# Patient Record
Sex: FEMALE | Race: WHITE | NOT HISPANIC OR LATINO
[De-identification: names, ages, dates, MRNs, and addresses within clinical notes are randomized per-mention and may not be internally consistent; named-entity substitution may affect disease eponyms.]

---

## 2017-01-05 ENCOUNTER — FORM ENCOUNTER (OUTPATIENT)
Age: 57
End: 2017-01-05

## 2017-01-06 ENCOUNTER — APPOINTMENT (OUTPATIENT)
Dept: RADIOLOGY | Facility: CLINIC | Age: 57
End: 2017-01-06

## 2017-01-06 ENCOUNTER — APPOINTMENT (OUTPATIENT)
Dept: MAMMOGRAPHY | Facility: CLINIC | Age: 57
End: 2017-01-06

## 2017-01-06 ENCOUNTER — APPOINTMENT (OUTPATIENT)
Dept: ULTRASOUND IMAGING | Facility: CLINIC | Age: 57
End: 2017-01-06

## 2017-01-06 ENCOUNTER — OUTPATIENT (OUTPATIENT)
Dept: OUTPATIENT SERVICES | Facility: HOSPITAL | Age: 57
LOS: 1 days | End: 2017-01-06
Payer: COMMERCIAL

## 2017-01-06 DIAGNOSIS — Z00.8 ENCOUNTER FOR OTHER GENERAL EXAMINATION: ICD-10-CM

## 2017-01-06 PROCEDURE — 76641 ULTRASOUND BREAST COMPLETE: CPT

## 2017-01-06 PROCEDURE — 77063 BREAST TOMOSYNTHESIS BI: CPT

## 2017-01-06 PROCEDURE — 77067 SCR MAMMO BI INCL CAD: CPT

## 2017-01-06 PROCEDURE — 77080 DXA BONE DENSITY AXIAL: CPT

## 2017-01-18 ENCOUNTER — FORM ENCOUNTER (OUTPATIENT)
Age: 57
End: 2017-01-18

## 2017-01-19 ENCOUNTER — OUTPATIENT (OUTPATIENT)
Dept: OUTPATIENT SERVICES | Facility: HOSPITAL | Age: 57
LOS: 1 days | End: 2017-01-19
Payer: COMMERCIAL

## 2017-01-19 ENCOUNTER — APPOINTMENT (OUTPATIENT)
Dept: ULTRASOUND IMAGING | Facility: CLINIC | Age: 57
End: 2017-01-19

## 2017-01-19 DIAGNOSIS — Z00.8 ENCOUNTER FOR OTHER GENERAL EXAMINATION: ICD-10-CM

## 2017-01-19 PROCEDURE — 76642 ULTRASOUND BREAST LIMITED: CPT

## 2017-01-24 DIAGNOSIS — N63 UNSPECIFIED LUMP IN BREAST: ICD-10-CM

## 2017-01-31 ENCOUNTER — RESULT REVIEW (OUTPATIENT)
Age: 57
End: 2017-01-31

## 2017-04-09 ENCOUNTER — EMERGENCY (EMERGENCY)
Facility: HOSPITAL | Age: 57
LOS: 0 days | Discharge: HOME | End: 2017-04-10
Attending: EMERGENCY MEDICINE | Admitting: EMERGENCY MEDICINE
Payer: COMMERCIAL

## 2017-04-09 VITALS
HEIGHT: 66 IN | TEMPERATURE: 98 F | RESPIRATION RATE: 17 BRPM | DIASTOLIC BLOOD PRESSURE: 70 MMHG | SYSTOLIC BLOOD PRESSURE: 135 MMHG | HEART RATE: 81 BPM | OXYGEN SATURATION: 99 % | WEIGHT: 229.94 LBS

## 2017-04-09 DIAGNOSIS — R07.9 CHEST PAIN, UNSPECIFIED: ICD-10-CM

## 2017-04-09 LAB
ALBUMIN SERPL ELPH-MCNC: 3.6 G/DL — SIGNIFICANT CHANGE UP (ref 3.3–5)
ALP SERPL-CCNC: 82 U/L — SIGNIFICANT CHANGE UP (ref 40–120)
ALT FLD-CCNC: 25 U/L — SIGNIFICANT CHANGE UP (ref 12–78)
ANION GAP SERPL CALC-SCNC: 11 MMOL/L — SIGNIFICANT CHANGE UP (ref 5–17)
AST SERPL-CCNC: 33 U/L — SIGNIFICANT CHANGE UP (ref 15–37)
BASOPHILS # BLD AUTO: 0.2 K/UL — SIGNIFICANT CHANGE UP (ref 0–0.2)
BASOPHILS NFR BLD AUTO: 1.3 % — SIGNIFICANT CHANGE UP (ref 0–2)
BILIRUB SERPL-MCNC: 0.4 MG/DL — SIGNIFICANT CHANGE UP (ref 0.2–1.2)
BUN SERPL-MCNC: 16 MG/DL — SIGNIFICANT CHANGE UP (ref 7–23)
CALCIUM SERPL-MCNC: 9.8 MG/DL — SIGNIFICANT CHANGE UP (ref 8.5–10.1)
CHLORIDE SERPL-SCNC: 104 MMOL/L — SIGNIFICANT CHANGE UP (ref 96–108)
CO2 SERPL-SCNC: 25 MMOL/L — SIGNIFICANT CHANGE UP (ref 22–31)
CREAT SERPL-MCNC: 0.71 MG/DL — SIGNIFICANT CHANGE UP (ref 0.5–1.3)
D DIMER BLD IA.RAPID-MCNC: <150 NG/ML DDU — SIGNIFICANT CHANGE UP
EOSINOPHIL # BLD AUTO: 0.3 K/UL — SIGNIFICANT CHANGE UP (ref 0–0.5)
EOSINOPHIL NFR BLD AUTO: 2.6 % — SIGNIFICANT CHANGE UP (ref 0–6)
GLUCOSE SERPL-MCNC: 154 MG/DL — HIGH (ref 70–99)
HCT VFR BLD CALC: 42.2 % — SIGNIFICANT CHANGE UP (ref 34.5–45)
HGB BLD-MCNC: 14 G/DL — SIGNIFICANT CHANGE UP (ref 11.5–15.5)
INR BLD: 1.09 RATIO — SIGNIFICANT CHANGE UP (ref 0.88–1.16)
LYMPHOCYTES # BLD AUTO: 36.8 % — SIGNIFICANT CHANGE UP (ref 13–44)
LYMPHOCYTES # BLD AUTO: 4.7 K/UL — HIGH (ref 1–3.3)
MAGNESIUM SERPL-MCNC: 2 MG/DL — SIGNIFICANT CHANGE UP (ref 1.8–2.4)
MCHC RBC-ENTMCNC: 28.4 PG — SIGNIFICANT CHANGE UP (ref 27–34)
MCHC RBC-ENTMCNC: 33.2 GM/DL — SIGNIFICANT CHANGE UP (ref 32–36)
MCV RBC AUTO: 85.7 FL — SIGNIFICANT CHANGE UP (ref 80–100)
MONOCYTES # BLD AUTO: 0.7 K/UL — SIGNIFICANT CHANGE UP (ref 0–0.9)
MONOCYTES NFR BLD AUTO: 5.3 % — SIGNIFICANT CHANGE UP (ref 2–14)
NEUTROPHILS # BLD AUTO: 6.9 K/UL — SIGNIFICANT CHANGE UP (ref 1.8–7.4)
NEUTROPHILS NFR BLD AUTO: 54 % — SIGNIFICANT CHANGE UP (ref 43–77)
PLATELET # BLD AUTO: 328 K/UL — SIGNIFICANT CHANGE UP (ref 150–400)
POTASSIUM SERPL-MCNC: 4.5 MMOL/L — SIGNIFICANT CHANGE UP (ref 3.5–5.3)
POTASSIUM SERPL-SCNC: 4.5 MMOL/L — SIGNIFICANT CHANGE UP (ref 3.5–5.3)
PROT SERPL-MCNC: 8.1 GM/DL — SIGNIFICANT CHANGE UP (ref 6–8.3)
PROTHROM AB SERPL-ACNC: 11.8 SEC — SIGNIFICANT CHANGE UP (ref 9.8–12.7)
RBC # BLD: 4.93 M/UL — SIGNIFICANT CHANGE UP (ref 3.8–5.2)
RBC # FLD: 12.6 % — SIGNIFICANT CHANGE UP (ref 10.3–14.5)
SODIUM SERPL-SCNC: 140 MMOL/L — SIGNIFICANT CHANGE UP (ref 135–145)
TROPONIN I SERPL-MCNC: <0.015 NG/ML — SIGNIFICANT CHANGE UP (ref 0.01–0.04)
WBC # BLD: 12.8 K/UL — HIGH (ref 3.8–10.5)
WBC # FLD AUTO: 12.8 K/UL — HIGH (ref 3.8–10.5)

## 2017-04-09 PROCEDURE — 99285 EMERGENCY DEPT VISIT HI MDM: CPT | Mod: 25

## 2017-04-09 PROCEDURE — 71010: CPT | Mod: 26

## 2017-04-09 PROCEDURE — 93010 ELECTROCARDIOGRAM REPORT: CPT

## 2017-04-09 RX ORDER — ASPIRIN/CALCIUM CARB/MAGNESIUM 324 MG
243 TABLET ORAL DAILY
Qty: 0 | Refills: 0 | Status: DISCONTINUED | OUTPATIENT
Start: 2017-04-09 | End: 2017-04-10

## 2017-04-09 RX ADMIN — Medication 243 MILLIGRAM(S): at 22:23

## 2017-04-09 NOTE — ED PROVIDER NOTE - MEDICAL DECISION MAKING DETAILS
Pt wo any cp.  All labs reviewed and pt is stable for DC to follow up with PMD this week.  Pt and  agree with plan

## 2017-04-09 NOTE — ED ADULT NURSE NOTE - CHPI ED SYMPTOMS NEG
no diaphoresis/no shortness of breath/no cough/no syncope/no dizziness/no nausea/no fever/no back pain/no vomiting/no chills

## 2017-04-09 NOTE — ED ADULT TRIAGE NOTE - CHIEF COMPLAINT QUOTE
Chest pain radiating into jaw and neck starting at 18:00. Reports taking aspirin at 18:00. Reports palpitations earlier today.

## 2017-04-09 NOTE — ED ADULT NURSE NOTE - OBJECTIVE STATEMENT
Pt is a 57y male, A & O x 4, presents to ED w/  chest pain left sided, radiates to throat neck and left arm, pt also c/o palpatations that began this morning but since resolved, neuro's intact, pt denies dizziness, nausea, vomiting or diarrhea. pt denies SOB, chills or fever, pt in no apparent distress, VSS, bed rails up, will continue to monitor.

## 2017-04-10 VITALS
RESPIRATION RATE: 17 BRPM | DIASTOLIC BLOOD PRESSURE: 69 MMHG | OXYGEN SATURATION: 99 % | SYSTOLIC BLOOD PRESSURE: 128 MMHG | TEMPERATURE: 98 F

## 2017-04-10 LAB — TROPONIN I SERPL-MCNC: <0.015 NG/ML — SIGNIFICANT CHANGE UP (ref 0.01–0.04)

## 2017-07-25 ENCOUNTER — MESSAGE (OUTPATIENT)
Age: 57
End: 2017-07-25

## 2017-08-28 ENCOUNTER — FORM ENCOUNTER (OUTPATIENT)
Age: 57
End: 2017-08-28

## 2017-08-29 ENCOUNTER — APPOINTMENT (OUTPATIENT)
Dept: MAMMOGRAPHY | Facility: CLINIC | Age: 57
End: 2017-08-29
Payer: COMMERCIAL

## 2017-08-29 ENCOUNTER — OUTPATIENT (OUTPATIENT)
Dept: OUTPATIENT SERVICES | Facility: HOSPITAL | Age: 57
LOS: 1 days | End: 2017-08-29
Payer: COMMERCIAL

## 2017-08-29 ENCOUNTER — APPOINTMENT (OUTPATIENT)
Dept: ULTRASOUND IMAGING | Facility: CLINIC | Age: 57
End: 2017-08-29
Payer: COMMERCIAL

## 2017-08-29 DIAGNOSIS — Z00.8 ENCOUNTER FOR OTHER GENERAL EXAMINATION: ICD-10-CM

## 2017-08-29 PROCEDURE — 76642 ULTRASOUND BREAST LIMITED: CPT

## 2017-08-29 PROCEDURE — 77065 DX MAMMO INCL CAD UNI: CPT

## 2017-08-29 PROCEDURE — G0279: CPT | Mod: 26

## 2017-08-29 PROCEDURE — G0206: CPT | Mod: 26,LT

## 2017-08-29 PROCEDURE — 76642 ULTRASOUND BREAST LIMITED: CPT | Mod: 26,LT

## 2017-08-29 PROCEDURE — G0279: CPT

## 2017-09-08 ENCOUNTER — INPATIENT (INPATIENT)
Facility: HOSPITAL | Age: 57
LOS: 1 days | Discharge: HOME | End: 2017-09-10
Attending: INTERNAL MEDICINE | Admitting: EMERGENCY MEDICINE
Payer: COMMERCIAL

## 2017-09-08 VITALS
WEIGHT: 229.94 LBS | TEMPERATURE: 98 F | HEIGHT: 66 IN | HEART RATE: 83 BPM | OXYGEN SATURATION: 100 % | RESPIRATION RATE: 18 BRPM

## 2017-09-08 DIAGNOSIS — Z90.710 ACQUIRED ABSENCE OF BOTH CERVIX AND UTERUS: Chronic | ICD-10-CM

## 2017-09-08 DIAGNOSIS — N30.00 ACUTE CYSTITIS WITHOUT HEMATURIA: ICD-10-CM

## 2017-09-08 DIAGNOSIS — K40.90 UNILATERAL INGUINAL HERNIA, WITHOUT OBSTRUCTION OR GANGRENE, NOT SPECIFIED AS RECURRENT: Chronic | ICD-10-CM

## 2017-09-08 DIAGNOSIS — R94.31 ABNORMAL ELECTROCARDIOGRAM [ECG] [EKG]: ICD-10-CM

## 2017-09-08 DIAGNOSIS — R55 SYNCOPE AND COLLAPSE: ICD-10-CM

## 2017-09-08 DIAGNOSIS — Z90.49 ACQUIRED ABSENCE OF OTHER SPECIFIED PARTS OF DIGESTIVE TRACT: Chronic | ICD-10-CM

## 2017-09-08 LAB
ADD ON TEST-SPECIMEN IN LAB: SIGNIFICANT CHANGE UP
ALBUMIN SERPL ELPH-MCNC: 3.5 G/DL — SIGNIFICANT CHANGE UP (ref 3.3–5)
ALP SERPL-CCNC: 72 U/L — SIGNIFICANT CHANGE UP (ref 40–120)
ALT FLD-CCNC: 21 U/L — SIGNIFICANT CHANGE UP (ref 12–78)
ANION GAP SERPL CALC-SCNC: 7 MMOL/L — SIGNIFICANT CHANGE UP (ref 5–17)
APPEARANCE UR: (no result)
APTT BLD: 27.8 SEC — SIGNIFICANT CHANGE UP (ref 27.5–37.4)
AST SERPL-CCNC: 22 U/L — SIGNIFICANT CHANGE UP (ref 15–37)
BACTERIA # UR AUTO: (no result)
BASOPHILS # BLD AUTO: 0.1 K/UL — SIGNIFICANT CHANGE UP (ref 0–0.2)
BASOPHILS NFR BLD AUTO: 1.5 % — SIGNIFICANT CHANGE UP (ref 0–2)
BILIRUB SERPL-MCNC: 0.3 MG/DL — SIGNIFICANT CHANGE UP (ref 0.2–1.2)
BILIRUB UR-MCNC: NEGATIVE — SIGNIFICANT CHANGE UP
BUN SERPL-MCNC: 11 MG/DL — SIGNIFICANT CHANGE UP (ref 7–23)
CALCIUM SERPL-MCNC: 9.3 MG/DL — SIGNIFICANT CHANGE UP (ref 8.5–10.1)
CHLORIDE SERPL-SCNC: 106 MMOL/L — SIGNIFICANT CHANGE UP (ref 96–108)
CO2 SERPL-SCNC: 25 MMOL/L — SIGNIFICANT CHANGE UP (ref 22–31)
COLOR SPEC: YELLOW — SIGNIFICANT CHANGE UP
CREAT SERPL-MCNC: 0.54 MG/DL — SIGNIFICANT CHANGE UP (ref 0.5–1.3)
D DIMER BLD IA.RAPID-MCNC: <150 NG/ML DDU — SIGNIFICANT CHANGE UP
DIFF PNL FLD: (no result)
EOSINOPHIL # BLD AUTO: 0.2 K/UL — SIGNIFICANT CHANGE UP (ref 0–0.5)
EOSINOPHIL NFR BLD AUTO: 2 % — SIGNIFICANT CHANGE UP (ref 0–6)
EPI CELLS # UR: SIGNIFICANT CHANGE UP
GLUCOSE SERPL-MCNC: 106 MG/DL — HIGH (ref 70–99)
GLUCOSE UR QL: NEGATIVE MG/DL — SIGNIFICANT CHANGE UP
HCT VFR BLD CALC: 38 % — SIGNIFICANT CHANGE UP (ref 34.5–45)
HGB BLD-MCNC: 12.8 G/DL — SIGNIFICANT CHANGE UP (ref 11.5–15.5)
INR BLD: 1.17 RATIO — HIGH (ref 0.88–1.16)
KETONES UR-MCNC: NEGATIVE — SIGNIFICANT CHANGE UP
LEUKOCYTE ESTERASE UR-ACNC: NEGATIVE — SIGNIFICANT CHANGE UP
LYMPHOCYTES # BLD AUTO: 2.9 K/UL — SIGNIFICANT CHANGE UP (ref 1–3.3)
LYMPHOCYTES # BLD AUTO: 31.7 % — SIGNIFICANT CHANGE UP (ref 13–44)
MAGNESIUM SERPL-MCNC: 2 MG/DL — SIGNIFICANT CHANGE UP (ref 1.6–2.6)
MCHC RBC-ENTMCNC: 28.4 PG — SIGNIFICANT CHANGE UP (ref 27–34)
MCHC RBC-ENTMCNC: 33.7 GM/DL — SIGNIFICANT CHANGE UP (ref 32–36)
MCV RBC AUTO: 84.4 FL — SIGNIFICANT CHANGE UP (ref 80–100)
MONOCYTES # BLD AUTO: 0.7 K/UL — SIGNIFICANT CHANGE UP (ref 0–0.9)
MONOCYTES NFR BLD AUTO: 7.3 % — SIGNIFICANT CHANGE UP (ref 2–14)
NEUTROPHILS # BLD AUTO: 5.3 K/UL — SIGNIFICANT CHANGE UP (ref 1.8–7.4)
NEUTROPHILS NFR BLD AUTO: 57.5 % — SIGNIFICANT CHANGE UP (ref 43–77)
NITRITE UR-MCNC: POSITIVE
NT-PROBNP SERPL-SCNC: 59 PG/ML — SIGNIFICANT CHANGE UP (ref 0–125)
PH UR: 5 — SIGNIFICANT CHANGE UP (ref 5–8)
PLATELET # BLD AUTO: 248 K/UL — SIGNIFICANT CHANGE UP (ref 150–400)
POTASSIUM SERPL-MCNC: 3.7 MMOL/L — SIGNIFICANT CHANGE UP (ref 3.5–5.3)
POTASSIUM SERPL-SCNC: 3.7 MMOL/L — SIGNIFICANT CHANGE UP (ref 3.5–5.3)
PROT SERPL-MCNC: 7.2 GM/DL — SIGNIFICANT CHANGE UP (ref 6–8.3)
PROT UR-MCNC: NEGATIVE MG/DL — SIGNIFICANT CHANGE UP
PROTHROM AB SERPL-ACNC: 12.7 SEC — SIGNIFICANT CHANGE UP (ref 9.8–12.7)
RBC # BLD: 4.51 M/UL — SIGNIFICANT CHANGE UP (ref 3.8–5.2)
RBC # FLD: 12.9 % — SIGNIFICANT CHANGE UP (ref 10.3–14.5)
RBC CASTS # UR COMP ASSIST: SIGNIFICANT CHANGE UP /HPF (ref 0–4)
SODIUM SERPL-SCNC: 138 MMOL/L — SIGNIFICANT CHANGE UP (ref 135–145)
SP GR SPEC: 1.02 — SIGNIFICANT CHANGE UP (ref 1.01–1.02)
TROPONIN I SERPL-MCNC: <0.015 NG/ML — SIGNIFICANT CHANGE UP (ref 0.01–0.04)
UROBILINOGEN FLD QL: NEGATIVE MG/DL — SIGNIFICANT CHANGE UP
WBC # BLD: 9.2 K/UL — SIGNIFICANT CHANGE UP (ref 3.8–10.5)
WBC # FLD AUTO: 9.2 K/UL — SIGNIFICANT CHANGE UP (ref 3.8–10.5)
WBC UR QL: SIGNIFICANT CHANGE UP

## 2017-09-08 PROCEDURE — 99285 EMERGENCY DEPT VISIT HI MDM: CPT

## 2017-09-08 PROCEDURE — 72125 CT NECK SPINE W/O DYE: CPT | Mod: 26

## 2017-09-08 PROCEDURE — 71010: CPT | Mod: 26

## 2017-09-08 PROCEDURE — 70450 CT HEAD/BRAIN W/O DYE: CPT | Mod: 26

## 2017-09-08 PROCEDURE — 93010 ELECTROCARDIOGRAM REPORT: CPT

## 2017-09-08 RX ORDER — SODIUM CHLORIDE 9 MG/ML
1000 INJECTION INTRAMUSCULAR; INTRAVENOUS; SUBCUTANEOUS ONCE
Qty: 0 | Refills: 0 | Status: COMPLETED | OUTPATIENT
Start: 2017-09-08 | End: 2017-09-08

## 2017-09-08 RX ORDER — ACETAMINOPHEN 500 MG
650 TABLET ORAL EVERY 6 HOURS
Qty: 0 | Refills: 0 | Status: DISCONTINUED | OUTPATIENT
Start: 2017-09-08 | End: 2017-09-10

## 2017-09-08 RX ORDER — SODIUM CHLORIDE 9 MG/ML
500 INJECTION INTRAMUSCULAR; INTRAVENOUS; SUBCUTANEOUS ONCE
Qty: 0 | Refills: 0 | Status: COMPLETED | OUTPATIENT
Start: 2017-09-08 | End: 2017-09-08

## 2017-09-08 RX ORDER — HEPARIN SODIUM 5000 [USP'U]/ML
5000 INJECTION INTRAVENOUS; SUBCUTANEOUS EVERY 8 HOURS
Qty: 0 | Refills: 0 | Status: DISCONTINUED | OUTPATIENT
Start: 2017-09-08 | End: 2017-09-10

## 2017-09-08 RX ORDER — NITROFURANTOIN MACROCRYSTAL 50 MG
100 CAPSULE ORAL
Qty: 0 | Refills: 0 | Status: DISCONTINUED | OUTPATIENT
Start: 2017-09-08 | End: 2017-09-10

## 2017-09-08 RX ORDER — SODIUM CHLORIDE 9 MG/ML
1000 INJECTION INTRAMUSCULAR; INTRAVENOUS; SUBCUTANEOUS
Qty: 0 | Refills: 0 | Status: DISCONTINUED | OUTPATIENT
Start: 2017-09-08 | End: 2017-09-09

## 2017-09-08 RX ORDER — ACETAMINOPHEN 500 MG
650 TABLET ORAL ONCE
Qty: 0 | Refills: 0 | Status: COMPLETED | OUTPATIENT
Start: 2017-09-08 | End: 2017-09-08

## 2017-09-08 RX ADMIN — Medication 650 MILLIGRAM(S): at 16:20

## 2017-09-08 RX ADMIN — SODIUM CHLORIDE 1000 MILLILITER(S): 9 INJECTION INTRAMUSCULAR; INTRAVENOUS; SUBCUTANEOUS at 13:53

## 2017-09-08 RX ADMIN — SODIUM CHLORIDE 2000 MILLILITER(S): 9 INJECTION INTRAMUSCULAR; INTRAVENOUS; SUBCUTANEOUS at 15:35

## 2017-09-08 RX ADMIN — HEPARIN SODIUM 5000 UNIT(S): 5000 INJECTION INTRAVENOUS; SUBCUTANEOUS at 22:00

## 2017-09-08 RX ADMIN — Medication 650 MILLIGRAM(S): at 16:50

## 2017-09-08 RX ADMIN — Medication 100 MILLIGRAM(S): at 21:39

## 2017-09-08 NOTE — H&P ADULT - PSH
H/O abdominal hysterectomy    History of cholecystectomy    Inguinal hernia without obstruction or gangrene, recurrence not specified, unspecified laterality

## 2017-09-08 NOTE — H&P ADULT - PROBLEM SELECTOR PLAN 1
Admit to telemetry, follow last Tpn (1st 2 normal)  Avoid drugs that could potentially prolong QT interval  add Mg stat, rest of electrolytes normal (K+ Ca+)  Cardiology consult  Echo in AM  Inpatient vs Outpatient Cardiac work up per Cardio

## 2017-09-08 NOTE — H&P ADULT - HISTORY OF PRESENT ILLNESS
56 y/o F with a PMHx of presents to the ED c/o syncopal episode that occurred earlier today while at work today. Pt states that she was standing and then remembers waking up on the floor with co workers around her. Pt friend states that she struck her head on the ground and did have LOC approximately 30 seconds reported. Pt currently calm, awake, alert and denies any other acute c/o at this time. Denies preceding CP/SOB, HA, weakness/numbness.  Patient was seen in the ED here 4/2017 with CP, d/c from ED never saw PMD/Cardio. EKG at that time same as today, increased , inverted T waves anterior leads)

## 2017-09-08 NOTE — ED PROVIDER NOTE - OBJECTIVE STATEMENT
56 y/o F with a PMHx of presents to the ED c/o syncopal episode that occurred earlier today while at work today. Pt states that she was standing and then remembers waking up on the floor with co workers around her. Pt friend states that she struck her head on the ground and did have LOC. Pt currently calm, awake, alert and denies any other acute c/o at this time.

## 2017-09-08 NOTE — ED ADULT NURSE REASSESSMENT NOTE - NS ED NURSE REASSESS COMMENT FT1
Report given to [address]. Pt and family aware of transfer. Pt medicated for UTI. VSS. Will continue to monitor for safety and comfort.

## 2017-09-08 NOTE — H&P ADULT - NSHPLABSRESULTS_GEN_ALL_CORE
12.8   9.2   )-----------( 248      ( 08 Sep 2017 13:29 )             38.0         138  |  106  |  11  ----------------------------<  106<H>  3.7   |  25  |  0.54    Ca    9.3      08 Sep 2017 13:29    TPro  7.2  /  Alb  3.5  /  TBili  0.3  /  DBili  x   /  AST  22  /  ALT  21  /  AlkPhos  72      CARDIAC MARKERS ( 08 Sep 2017 16:50 )  <0.015 ng/mL / x     / x     / x     / x      CARDIAC MARKERS ( 08 Sep 2017 13:29 )  <0.015 ng/mL / x     / x     / x     / x          LIVER FUNCTIONS - ( 08 Sep 2017 13:29 )  Alb: 3.5 g/dL / Pro: 7.2 gm/dL / ALK PHOS: 72 U/L / ALT: 21 U/L / AST: 22 U/L / GGT: x           PT/INR - ( 08 Sep 2017 13:30 )   PT: 12.7 sec;   INR: 1.17 ratio         PTT - ( 08 Sep 2017 13:30 )  PTT:27.8 sec  Urinalysis Basic - ( 08 Sep 2017 14:45 )    Color: Yellow / Appearance: x / S.020 / pH: x  Gluc: x / Ketone: Negative  / Bili: Negative / Urobili: Negative mg/dL   Blood: x / Protein: Negative mg/dL / Nitrite: Positive   Leuk Esterase: Negative / RBC: 0-2 /HPF / WBC 0-2   Sq Epi: x / Non Sq Epi: x / Bacteria: Many          Blood, Urine: Trace ( @ 14:45)

## 2017-09-08 NOTE — ED ADULT NURSE REASSESSMENT NOTE - NS ED NURSE REASSESS COMMENT FT1
Pt medicated with tylenol for headache. VSS.  Second bolus of fluid infusing at this time. Will continue to monitor for safety and comfort.

## 2017-09-08 NOTE — ED ADULT NURSE NOTE - OBJECTIVE STATEMENT
56 y/o F BIBA s/p syncopal episode at work. Pt c/o head, neck and jaw pain at this time. Pt states prior to episode she felt slightly dizzy. Pt states she ate breakfast this morning. Denies syncopal hx or medical hx. No current meds.

## 2017-09-09 LAB
HBA1C BLD-MCNC: 6.6 % — HIGH (ref 4–5.6)
TSH SERPL-MCNC: 1.49 UIU/ML — SIGNIFICANT CHANGE UP (ref 0.36–3.74)

## 2017-09-09 PROCEDURE — 93010 ELECTROCARDIOGRAM REPORT: CPT

## 2017-09-09 PROCEDURE — 93306 TTE W/DOPPLER COMPLETE: CPT | Mod: 26

## 2017-09-09 RX ADMIN — HEPARIN SODIUM 5000 UNIT(S): 5000 INJECTION INTRAVENOUS; SUBCUTANEOUS at 22:02

## 2017-09-09 RX ADMIN — Medication 100 MILLIGRAM(S): at 18:21

## 2017-09-09 RX ADMIN — HEPARIN SODIUM 5000 UNIT(S): 5000 INJECTION INTRAVENOUS; SUBCUTANEOUS at 14:42

## 2017-09-09 RX ADMIN — Medication 100 MILLIGRAM(S): at 08:30

## 2017-09-09 RX ADMIN — HEPARIN SODIUM 5000 UNIT(S): 5000 INJECTION INTRAVENOUS; SUBCUTANEOUS at 06:00

## 2017-09-09 NOTE — PROGRESS NOTE ADULT - SUBJECTIVE AND OBJECTIVE BOX
History and Physical:   Dr Walton, PCP - notified      History of Present Illness:  Chief Complaint/Reason for Admission: Syncope    History of Present Illness:    56 y/o F with a PMHx of presents to the ED c/o syncopal episode that occurred earlier today while at work today. Pt states that she was standing and then remembers waking up on the floor with co workers around her. Pt friend states that she struck her head on the ground and did have LOC approximately 30 seconds reported. Pt currently calm, awake, alert and denies any other acute c/o at this time. Denies preceding CP/SOB, HA, weakness/numbness.  Patient was seen in the ED here 4/2017 with CP, d/c from ED never saw PMD/Cardio. EKG at that time same as today, increased , inverted T waves anterior leads)  9/9 - no events on tele.  c/o HA no cp, dizziness, sob.    ICU Vital Signs Last 24 Hrs  T(C): 36.8 (09 Sep 2017 11:34), Max: 36.8 (09 Sep 2017 11:34)  T(F): 98.3 (09 Sep 2017 11:34), Max: 98.3 (09 Sep 2017 11:34)  HR: 66 (09 Sep 2017 11:34) (66 - 76)  BP: 155/77 (09 Sep 2017 11:34) (139/80 - 155/77)  BP(mean): --  ABP: --  ABP(mean): --  RR: 18 (09 Sep 2017 11:34) (15 - 18)  SpO2: 95% (09 Sep 2017 11:34) (95% - 98%)    GEN: lying in bed, NAD  HEENT:   NC, pupils equal and reactive, EOMI  CV:  +S1, +S2, RRR  RESP:   lungs clear to auscultation bilaterally, no wheeze, rales, rhonchi   BREAST:  not examined  GI:  abdomen soft, non-tender, non-distended, normoactive BS  RECTAL:  not examined  :  not examined  MSK:   normal muscle tone  EXT:  no edema  NEURO:  AAOX3, no focal neurological deficits  SKIN:  no rashes      Assessment and Plan:     # Prolonged QT interval.  Plan: Admit to telemetry, follow last Tpn (1st 2 normal)  Avoid drugs that could potentially prolong QT interval  - f/u cardio consult  - monitor and replace lytes  - monitor on tele  - f/u ECHO results  - possible holter upon d/c     # Syncope, unspecified syncope type.  Plan: IVF  Echo, repeat labs in AM, Cardio as above.     #  Acute cystitis without hematuria.  Plan: UA positive in ED  Follow Urine Cx, likely on d/c  Macrobid 100mg BID  Encourage oral fluids.     dispo - likely d/c in AM if ok with cardio and no events on tele

## 2017-09-09 NOTE — DIETITIAN INITIAL EVALUATION ADULT. - OTHER INFO
Nutrition consult for BMI >40, however current BMI 37.7. Tolerating regular diet with ~100% intake noted. Denies any difficulty chewing or swallowing. Skin intact with no edema noted. General healthy Nutrition consult for BMI >40, however current BMI 37.7. Tolerating regular diet with ~100% intake noted. Denies any difficulty chewing or swallowing. Skin intact with no edema noted. General healthy diet instruction provided with RD contact information for any further questions or concerns.

## 2017-09-09 NOTE — CHART NOTE - NSCHARTNOTEFT_GEN_A_CORE
Call received from RN staff, increased BP    Pt. is 57y F presents to the ED after syncopal episode, admitted with Prolonged QT interval, Syncope, acute cystitis. Troponins negative, CT head negative for acute pathology, patient currently having elevated blood pressure, 180/74. No history of Hypertension, no hypertensive medications in outpatient medication review. Patient IVF running at 100cc/hr D/C'd. Patient on regular diet, may still take in fluids PO, will repeat blood pressure readings in one hour.    Vital Signs Last 24 Hrs  T(C): 36.4 (09 Sep 2017 20:28), Max: 36.9 (09 Sep 2017 16:56)  T(F): 97.6 (09 Sep 2017 20:28), Max: 98.4 (09 Sep 2017 16:56)  HR: 82 (09 Sep 2017 20:28) (66 - 82)  BP: 180/74 (09 Sep 2017 20:28) (155/77 - 180/74)  BP(mean): --  RR: 18 (09 Sep 2017 11:34) (18 - 18)  SpO2: 96% (09 Sep 2017 20:28) (95% - 96%)    A/P  57y F with Prolonged QT, Syncope, Cystitis, currently showing Hypertension.  -D/C fluids  -Recheck BP one hour

## 2017-09-10 ENCOUNTER — TRANSCRIPTION ENCOUNTER (OUTPATIENT)
Age: 57
End: 2017-09-10

## 2017-09-10 VITALS
DIASTOLIC BLOOD PRESSURE: 78 MMHG | OXYGEN SATURATION: 94 % | RESPIRATION RATE: 18 BRPM | HEART RATE: 78 BPM | SYSTOLIC BLOOD PRESSURE: 135 MMHG | TEMPERATURE: 99 F

## 2017-09-10 LAB
-  AMIKACIN: SIGNIFICANT CHANGE UP
-  AMPICILLIN/SULBACTAM: SIGNIFICANT CHANGE UP
-  AMPICILLIN: SIGNIFICANT CHANGE UP
-  AZTREONAM: SIGNIFICANT CHANGE UP
-  CEFAZOLIN: SIGNIFICANT CHANGE UP
-  CEFEPIME: SIGNIFICANT CHANGE UP
-  CEFOXITIN: SIGNIFICANT CHANGE UP
-  CEFTAZIDIME: SIGNIFICANT CHANGE UP
-  CEFTRIAXONE: SIGNIFICANT CHANGE UP
-  CIPROFLOXACIN: SIGNIFICANT CHANGE UP
-  ERTAPENEM: SIGNIFICANT CHANGE UP
-  GENTAMICIN: SIGNIFICANT CHANGE UP
-  IMIPENEM: SIGNIFICANT CHANGE UP
-  LEVOFLOXACIN: SIGNIFICANT CHANGE UP
-  MEROPENEM: SIGNIFICANT CHANGE UP
-  NITROFURANTOIN: SIGNIFICANT CHANGE UP
-  PIPERACILLIN/TAZOBACTAM: SIGNIFICANT CHANGE UP
-  TOBRAMYCIN: SIGNIFICANT CHANGE UP
-  TRIMETHOPRIM/SULFAMETHOXAZOLE: SIGNIFICANT CHANGE UP
ALBUMIN SERPL ELPH-MCNC: 3.3 G/DL — SIGNIFICANT CHANGE UP (ref 3.3–5)
ALP SERPL-CCNC: 69 U/L — SIGNIFICANT CHANGE UP (ref 40–120)
ALT FLD-CCNC: 21 U/L — SIGNIFICANT CHANGE UP (ref 12–78)
ANION GAP SERPL CALC-SCNC: 8 MMOL/L — SIGNIFICANT CHANGE UP (ref 5–17)
AST SERPL-CCNC: 13 U/L — LOW (ref 15–37)
BILIRUB SERPL-MCNC: 0.3 MG/DL — SIGNIFICANT CHANGE UP (ref 0.2–1.2)
BUN SERPL-MCNC: 10 MG/DL — SIGNIFICANT CHANGE UP (ref 7–23)
CALCIUM SERPL-MCNC: 9.1 MG/DL — SIGNIFICANT CHANGE UP (ref 8.5–10.1)
CHLORIDE SERPL-SCNC: 106 MMOL/L — SIGNIFICANT CHANGE UP (ref 96–108)
CO2 SERPL-SCNC: 26 MMOL/L — SIGNIFICANT CHANGE UP (ref 22–31)
CREAT SERPL-MCNC: 0.55 MG/DL — SIGNIFICANT CHANGE UP (ref 0.5–1.3)
CULTURE RESULTS: SIGNIFICANT CHANGE UP
GLUCOSE SERPL-MCNC: 130 MG/DL — HIGH (ref 70–99)
METHOD TYPE: SIGNIFICANT CHANGE UP
ORGANISM # SPEC MICROSCOPIC CNT: SIGNIFICANT CHANGE UP
ORGANISM # SPEC MICROSCOPIC CNT: SIGNIFICANT CHANGE UP
POTASSIUM SERPL-MCNC: 3.6 MMOL/L — SIGNIFICANT CHANGE UP (ref 3.5–5.3)
POTASSIUM SERPL-SCNC: 3.6 MMOL/L — SIGNIFICANT CHANGE UP (ref 3.5–5.3)
PROT SERPL-MCNC: 6.8 GM/DL — SIGNIFICANT CHANGE UP (ref 6–8.3)
SODIUM SERPL-SCNC: 140 MMOL/L — SIGNIFICANT CHANGE UP (ref 135–145)
SPECIMEN SOURCE: SIGNIFICANT CHANGE UP

## 2017-09-10 RX ORDER — ACETAMINOPHEN 500 MG
2 TABLET ORAL
Qty: 0 | Refills: 0 | DISCHARGE
Start: 2017-09-10

## 2017-09-10 RX ORDER — CEFUROXIME AXETIL 250 MG
1 TABLET ORAL
Qty: 6 | Refills: 0
Start: 2017-09-10 | End: 2017-09-13

## 2017-09-10 RX ADMIN — HEPARIN SODIUM 5000 UNIT(S): 5000 INJECTION INTRAVENOUS; SUBCUTANEOUS at 05:24

## 2017-09-10 RX ADMIN — Medication 100 MILLIGRAM(S): at 10:49

## 2017-09-10 NOTE — DISCHARGE NOTE ADULT - CARE PLAN
Principal Discharge DX:	Syncope, unspecified syncope type  Goal:	complete workup  Instructions for follow-up, activity and diet:	follow up with cardiology and primary doctor as outpatient

## 2017-09-10 NOTE — PROGRESS NOTE ADULT - SUBJECTIVE AND OBJECTIVE BOX
Patient is a 57y old  Female who presents with a chief complaint of Syncope (08 Sep 2017 20:52)      HPI:  56 y/o F with a PMHx of presents to the ED c/o syncopal episode that occurred earlier today while at work today. Pt states that she was standing and then remembers waking up on the floor with co workers around her. Pt friend states that she struck her head on the ground and did have LOC approximately 30 seconds reported. Pt currently calm, awake, alert and denies any other acute c/o at this time. Denies preceding CP/SOB, HA, weakness/numbness.  Patient was seen in the ED here 2017 with CP, d/c from ED never saw PMD/Cardio. EKG at that time same as today, increased , inverted T waves anterior leads) (08 Sep 2017 20:52)  9/10 no new complaints.     PAST MEDICAL & SURGICAL HISTORY:  No pertinent past medical history  History of cholecystectomy  Inguinal hernia without obstruction or gangrene, recurrence not specified, unspecified laterality  H/O abdominal hysterectomy        MEDICATIONS  (STANDING):  heparin  Injectable 5000 Unit(s) SubCutaneous every 8 hours  nitrofurantoin (MACROBID) 100 milliGRAM(s) Oral two times a day with meals    MEDICATIONS  (PRN):  acetaminophen   Tablet. 650 milliGRAM(s) Oral every 6 hours PRN Moderate Pain (4 - 6)          Vital Signs Last 24 Hrs  T(C): 36.9 (10 Sep 2017 04:25), Max: 36.9 (09 Sep 2017 16:56)  T(F): 98.4 (10 Sep 2017 04:25), Max: 98.5 (09 Sep 2017 21:59)  HR: 69 (10 Sep 2017 04:25) (66 - 82)  BP: 151/70 (10 Sep 2017 04:25) (128/67 - 180/74)  BP(mean): --  RR: 18 (09 Sep 2017 11:34) (18 - 18)  SpO2: 94% (10 Sep 2017 04:25) (94% - 96%)    I&O's Summary    09 Sep 2017 07:01  -  10 Sep 2017 07:00  --------------------------------------------------------  IN: 1740 mL / OUT: 0 mL / NET: 1740 mL        PHYSICAL EXAM  General Appearance:NAD  HEENT:   Neck:   Back:   Lungs: clear  Heart: RR S1S2 syst m  Abdomen: soft nt  Extremities: no edema  Skin:   Neurologic: alert and oriented      INTERPRETATION OF TELEMETRY: NSR sinus adeline and occ juntional bradycardia when sleeping    ECG: NSR ant t wave inv        LABS:                          12.8   9.2   )-----------( 248      ( 08 Sep 2017 13:29 )             38.0     -10    140  |  106  |  10  ----------------------------<  130<H>  3.6   |  26  |  0.55    Ca    9.1      10 Sep 2017 06:12  Mg     2.0         TPro  6.8  /  Alb  3.3  /  TBili  0.3  /  DBili  x   /  AST  13<L>  /  ALT  21  /  AlkPhos  69  09-10    CARDIAC MARKERS ( 08 Sep 2017 21:42 )  <0.015 ng/mL / x     / x     / x     / x      CARDIAC MARKERS ( 08 Sep 2017 16:50 )  <0.015 ng/mL / x     / x     / x     / x      CARDIAC MARKERS ( 08 Sep 2017 13:29 )  <0.015 ng/mL / x     / x     / x     / x            Pro BNP  --  @ 13:30  D Dimer  <150  @ 13:30  Pro BNP  59  @ 13:29  D Dimer  --  @ 13:29    PT/INR - ( 08 Sep 2017 13:30 )   PT: 12.7 sec;   INR: 1.17 ratio         PTT - ( 08 Sep 2017 13:30 )  PTT:27.8 sec  Urinalysis Basic - ( 08 Sep 2017 14:45 )    Color: Yellow / Appearance: x / S.020 / pH: x  Gluc: x / Ketone: Negative  / Bili: Negative / Urobili: Negative mg/dL   Blood: x / Protein: Negative mg/dL / Nitrite: Positive   Leuk Esterase: Negative / RBC: 0-2 /HPF / WBC 0-2   Sq Epi: x / Non Sq Epi: x / Bacteria: Many            RADIOLOGY & ADDITIONAL STUDIES:

## 2017-09-10 NOTE — DISCHARGE NOTE ADULT - MEDICATION SUMMARY - MEDICATIONS TO TAKE
I will START or STAY ON the medications listed below when I get home from the hospital:    acetaminophen 500 mg oral tablet  -- 2 tab(s) by mouth every 6 hours  -- Indication: For for headache or pain    Ceftin 250 mg oral tablet  -- 1 tab(s) by mouth 2 times a day   -- Finish all this medication unless otherwise directed by prescriber.  Medication should be taken with plenty of water.  Take with food or milk.    -- Indication: For Antibiotic

## 2017-09-10 NOTE — DISCHARGE NOTE ADULT - PATIENT PORTAL LINK FT
“You can access the [employer] Patient Portal, offered by [facility], by registering with the following website: [email or web address]”

## 2017-09-10 NOTE — DISCHARGE NOTE ADULT - HOSPITAL COURSE
History and Physical:   Dr Preston Walton, PCP - notified via service      History of Present Illness:  Chief Complaint/Reason for Admission: Syncope - final dx - likely vasovagal syncope     History of Present Illness:    58 y/o F with a PMHx of presents to the ED c/o syncopal episode that occurred earlier today while at work today. Pt states that she was standing and then remembers waking up on the floor with co workers around her. Pt friend states that she struck her head on the ground and did have LOC approximately 30 seconds reported. Pt currently calm, awake, alert and denies any other acute c/o at this time. Denies preceding CP/SOB, HA, weakness/numbness.  Patient was seen in the ED here 4/2017 with CP, d/c from ED never saw PMD/Cardio. EKG at that time same as today, increased , inverted T waves anterior leads)    pt admitted and monitored with adeline on tele while sleeping.  seen by dr jarrell and felt likely vasovagal syncope and for f/u as outpt.  pt aware to f/u with PMD and cardio as outpt this week.    ICU Vital Signs Last 24 Hrs  T(C): 37 (10 Sep 2017 10:48), Max: 37 (10 Sep 2017 10:48)  T(F): 98.6 (10 Sep 2017 10:48), Max: 98.6 (10 Sep 2017 10:48)  HR: 78 (10 Sep 2017 10:48) (69 - 82)  BP: 135/78 (10 Sep 2017 10:48) (128/67 - 180/74)  BP(mean): --  ABP: --  ABP(mean): --  RR: 18 (10 Sep 2017 10:48) (18 - 18)  SpO2: 94% (10 Sep 2017 10:48) (94% - 96%)    GEN: lying in bed, NAD  HEENT:   NC/AT, pupils equal and reactive, EOMI  CV:  +S1, +S2, RRR  RESP:   lungs clear to auscultation bilaterally, no wheeze, rales, rhonchi   BREAST:  not examined  GI:  abdomen soft, non-tender, non-distended, normoactive BS  RECTAL:  not examined  :  not examined  MSK:   normal muscle tone  EXT:  no edema  NEURO:  AAOX3, no focal neurological deficits  SKIN:  no rashes  trop neg x 3   Assessment and Plan:     # Prolonged QT interval.  Plan: Admit to telemetry, follow last Tpn (1st 2 normal)  Avoid drugs that could potentially prolong QT interval    < from: Transthoracic Echocardiogram (09.09.17 @ 10:02) >   Normal appearing mitral valve structure and function.   Trace mitral regurgitation is present.   Normal aortic valve structure and function.   Normal appearing tricuspid valve structure and function.   Trace tricuspid valve regurgitation is present.   Normal appearing pulmonic valve structure and function.   The left atrium is mildly dilated.   The left ventricle is normal in size, wall motion and contractility.   Mild concentric left ventricular hypertrophy is present.   Estimated left ventricular ejection fraction is 60-65 %.   Normal appearing right atrium.   Normal appearing right ventricle structure andfunction.    < end of copied text >    # Syncope, likely vasovagal s/p IVF  nl lv fxn on ECHO     #  Acute cystitis without hematuria.  ucx  > 100k e.coli  - ceftin x 3 days     total time spent 35 mins

## 2017-09-10 NOTE — PROGRESS NOTE ADULT - ASSESSMENT
1. Syncope, likely vasovagal.  2. Intermitent bradycardia and jucntional rhythm likely sleep apnea.  3. abnormal ecg. no change from previous. Qt is normal    Plan: ok to d/c

## 2017-09-10 NOTE — DISCHARGE NOTE ADULT - CARE PROVIDER_API CALL
Preston Walton), Internal Medicine  [address]   [address]  [place] [address]  Phone: [phone]  Fax: [phone]    Jeovanny Roberts), Cardiovascular Disease; Internal Medicine  [address] [place] [address]  Phone: [phone]  Fax: [phone]

## 2017-09-12 ENCOUNTER — RESULT REVIEW (OUTPATIENT)
Age: 57
End: 2017-09-12

## 2017-09-13 DIAGNOSIS — Z90.49 ACQUIRED ABSENCE OF OTHER SPECIFIED PARTS OF DIGESTIVE TRACT: ICD-10-CM

## 2017-09-13 DIAGNOSIS — Z90.710 ACQUIRED ABSENCE OF BOTH CERVIX AND UTERUS: ICD-10-CM

## 2017-09-13 DIAGNOSIS — I10 ESSENTIAL (PRIMARY) HYPERTENSION: ICD-10-CM

## 2017-09-13 DIAGNOSIS — G47.30 SLEEP APNEA, UNSPECIFIED: ICD-10-CM

## 2017-09-13 DIAGNOSIS — B96.20 UNSPECIFIED ESCHERICHIA COLI [E. COLI] AS THE CAUSE OF DISEASES CLASSIFIED ELSEWHERE: ICD-10-CM

## 2017-09-13 DIAGNOSIS — R55 SYNCOPE AND COLLAPSE: ICD-10-CM

## 2017-09-13 DIAGNOSIS — R94.31 ABNORMAL ELECTROCARDIOGRAM [ECG] [EKG]: ICD-10-CM

## 2017-09-13 DIAGNOSIS — I45.81 LONG QT SYNDROME: ICD-10-CM

## 2017-09-13 DIAGNOSIS — R00.1 BRADYCARDIA, UNSPECIFIED: ICD-10-CM

## 2017-09-13 DIAGNOSIS — N30.00 ACUTE CYSTITIS WITHOUT HEMATURIA: ICD-10-CM

## 2017-09-13 DIAGNOSIS — I47.1 SUPRAVENTRICULAR TACHYCARDIA: ICD-10-CM

## 2018-05-21 ENCOUNTER — APPOINTMENT (OUTPATIENT)
Dept: DERMATOLOGY | Facility: CLINIC | Age: 58
End: 2018-05-21
Payer: COMMERCIAL

## 2018-05-21 DIAGNOSIS — Z41.1 ENCOUNTER FOR COSMETIC SURGERY: ICD-10-CM

## 2018-05-21 PROCEDURE — D0125: CPT

## 2018-06-21 ENCOUNTER — TRANSCRIPTION ENCOUNTER (OUTPATIENT)
Age: 58
End: 2018-06-21

## 2019-01-22 ENCOUNTER — APPOINTMENT (OUTPATIENT)
Dept: OTOLARYNGOLOGY | Facility: CLINIC | Age: 59
End: 2019-01-22

## 2019-11-20 ENCOUNTER — APPOINTMENT (OUTPATIENT)
Dept: MAMMOGRAPHY | Facility: CLINIC | Age: 59
End: 2019-11-20
Payer: COMMERCIAL

## 2019-11-20 ENCOUNTER — OUTPATIENT (OUTPATIENT)
Dept: OUTPATIENT SERVICES | Facility: HOSPITAL | Age: 59
LOS: 1 days | End: 2019-11-20
Payer: COMMERCIAL

## 2019-11-20 ENCOUNTER — APPOINTMENT (OUTPATIENT)
Dept: ULTRASOUND IMAGING | Facility: CLINIC | Age: 59
End: 2019-11-20
Payer: COMMERCIAL

## 2019-11-20 DIAGNOSIS — Z90.49 ACQUIRED ABSENCE OF OTHER SPECIFIED PARTS OF DIGESTIVE TRACT: Chronic | ICD-10-CM

## 2019-11-20 DIAGNOSIS — Z00.8 ENCOUNTER FOR OTHER GENERAL EXAMINATION: ICD-10-CM

## 2019-11-20 DIAGNOSIS — Z90.710 ACQUIRED ABSENCE OF BOTH CERVIX AND UTERUS: Chronic | ICD-10-CM

## 2019-11-20 DIAGNOSIS — K40.90 UNILATERAL INGUINAL HERNIA, WITHOUT OBSTRUCTION OR GANGRENE, NOT SPECIFIED AS RECURRENT: Chronic | ICD-10-CM

## 2019-11-20 PROCEDURE — 77067 SCR MAMMO BI INCL CAD: CPT | Mod: 26

## 2019-11-20 PROCEDURE — 77063 BREAST TOMOSYNTHESIS BI: CPT | Mod: 26

## 2019-11-20 PROCEDURE — 76641 ULTRASOUND BREAST COMPLETE: CPT | Mod: 26,50

## 2019-11-20 PROCEDURE — 77067 SCR MAMMO BI INCL CAD: CPT

## 2019-11-20 PROCEDURE — 76641 ULTRASOUND BREAST COMPLETE: CPT

## 2019-11-20 PROCEDURE — 77063 BREAST TOMOSYNTHESIS BI: CPT

## 2019-11-26 ENCOUNTER — APPOINTMENT (OUTPATIENT)
Dept: ULTRASOUND IMAGING | Facility: CLINIC | Age: 59
End: 2019-11-26
Payer: COMMERCIAL

## 2019-11-26 ENCOUNTER — OUTPATIENT (OUTPATIENT)
Dept: OUTPATIENT SERVICES | Facility: HOSPITAL | Age: 59
LOS: 1 days | End: 2019-11-26
Payer: COMMERCIAL

## 2019-11-26 DIAGNOSIS — Z90.710 ACQUIRED ABSENCE OF BOTH CERVIX AND UTERUS: Chronic | ICD-10-CM

## 2019-11-26 DIAGNOSIS — K40.90 UNILATERAL INGUINAL HERNIA, WITHOUT OBSTRUCTION OR GANGRENE, NOT SPECIFIED AS RECURRENT: Chronic | ICD-10-CM

## 2019-11-26 DIAGNOSIS — Z90.49 ACQUIRED ABSENCE OF OTHER SPECIFIED PARTS OF DIGESTIVE TRACT: Chronic | ICD-10-CM

## 2019-11-26 DIAGNOSIS — Z00.8 ENCOUNTER FOR OTHER GENERAL EXAMINATION: ICD-10-CM

## 2019-11-26 PROCEDURE — 76642 ULTRASOUND BREAST LIMITED: CPT | Mod: 26,RT

## 2019-11-26 PROCEDURE — 76642 ULTRASOUND BREAST LIMITED: CPT

## 2019-12-31 ENCOUNTER — TRANSCRIPTION ENCOUNTER (OUTPATIENT)
Age: 59
End: 2019-12-31

## 2021-01-15 ENCOUNTER — OUTPATIENT (OUTPATIENT)
Dept: OUTPATIENT SERVICES | Facility: HOSPITAL | Age: 61
LOS: 1 days | Discharge: HOME | End: 2021-01-15
Payer: COMMERCIAL

## 2021-01-15 VITALS — TEMPERATURE: 98 F | HEIGHT: 66 IN | OXYGEN SATURATION: 96 % | WEIGHT: 266.98 LBS | RESPIRATION RATE: 18 BRPM

## 2021-01-15 DIAGNOSIS — Z90.710 ACQUIRED ABSENCE OF BOTH CERVIX AND UTERUS: Chronic | ICD-10-CM

## 2021-01-15 DIAGNOSIS — Z01.818 ENCOUNTER FOR OTHER PREPROCEDURAL EXAMINATION: ICD-10-CM

## 2021-01-15 DIAGNOSIS — G47.30 SLEEP APNEA, UNSPECIFIED: ICD-10-CM

## 2021-01-15 DIAGNOSIS — Z90.49 ACQUIRED ABSENCE OF OTHER SPECIFIED PARTS OF DIGESTIVE TRACT: Chronic | ICD-10-CM

## 2021-01-15 DIAGNOSIS — M17.11 UNILATERAL PRIMARY OSTEOARTHRITIS, RIGHT KNEE: ICD-10-CM

## 2021-01-15 DIAGNOSIS — K40.90 UNILATERAL INGUINAL HERNIA, WITHOUT OBSTRUCTION OR GANGRENE, NOT SPECIFIED AS RECURRENT: Chronic | ICD-10-CM

## 2021-01-15 LAB
A1C WITH ESTIMATED AVERAGE GLUCOSE RESULT: 9.3 % — HIGH (ref 4–5.6)
ANION GAP SERPL CALC-SCNC: 11 MMOL/L — SIGNIFICANT CHANGE UP (ref 5–17)
APTT BLD: 31.3 SEC — SIGNIFICANT CHANGE UP (ref 27.5–35.5)
BASOPHILS # BLD AUTO: 0.05 K/UL — SIGNIFICANT CHANGE UP (ref 0–0.2)
BASOPHILS NFR BLD AUTO: 0.6 % — SIGNIFICANT CHANGE UP (ref 0–2)
BUN SERPL-MCNC: 12 MG/DL — SIGNIFICANT CHANGE UP (ref 7–23)
CALCIUM SERPL-MCNC: 9.4 MG/DL — SIGNIFICANT CHANGE UP (ref 8.5–10.1)
CHLORIDE SERPL-SCNC: 103 MMOL/L — SIGNIFICANT CHANGE UP (ref 96–108)
CO2 SERPL-SCNC: 22 MMOL/L — SIGNIFICANT CHANGE UP (ref 22–31)
CREAT SERPL-MCNC: 0.62 MG/DL — SIGNIFICANT CHANGE UP (ref 0.5–1.3)
EOSINOPHIL # BLD AUTO: 0.16 K/UL — SIGNIFICANT CHANGE UP (ref 0–0.5)
EOSINOPHIL NFR BLD AUTO: 1.8 % — SIGNIFICANT CHANGE UP (ref 0–6)
ESTIMATED AVERAGE GLUCOSE: 220 MG/DL — HIGH (ref 68–114)
GLUCOSE SERPL-MCNC: 250 MG/DL — HIGH (ref 70–99)
HCT VFR BLD CALC: 40.5 % — SIGNIFICANT CHANGE UP (ref 34.5–45)
HGB BLD-MCNC: 13.3 G/DL — SIGNIFICANT CHANGE UP (ref 11.5–15.5)
IMM GRANULOCYTES NFR BLD AUTO: 0.5 % — SIGNIFICANT CHANGE UP (ref 0–1.5)
INR BLD: 1.1 RATIO — SIGNIFICANT CHANGE UP (ref 0.88–1.16)
LYMPHOCYTES # BLD AUTO: 2.69 K/UL — SIGNIFICANT CHANGE UP (ref 1–3.3)
LYMPHOCYTES # BLD AUTO: 30.5 % — SIGNIFICANT CHANGE UP (ref 13–44)
MCHC RBC-ENTMCNC: 27.8 PG — SIGNIFICANT CHANGE UP (ref 27–34)
MCHC RBC-ENTMCNC: 32.8 GM/DL — SIGNIFICANT CHANGE UP (ref 32–36)
MCV RBC AUTO: 84.7 FL — SIGNIFICANT CHANGE UP (ref 80–100)
MONOCYTES # BLD AUTO: 0.58 K/UL — SIGNIFICANT CHANGE UP (ref 0–0.9)
MONOCYTES NFR BLD AUTO: 6.6 % — SIGNIFICANT CHANGE UP (ref 2–14)
NEUTROPHILS # BLD AUTO: 5.3 K/UL — SIGNIFICANT CHANGE UP (ref 1.8–7.4)
NEUTROPHILS NFR BLD AUTO: 60 % — SIGNIFICANT CHANGE UP (ref 43–77)
NRBC # BLD: 0 /100 WBCS — SIGNIFICANT CHANGE UP (ref 0–0)
PLATELET # BLD AUTO: 306 K/UL — SIGNIFICANT CHANGE UP (ref 150–400)
POTASSIUM SERPL-MCNC: 4 MMOL/L — SIGNIFICANT CHANGE UP (ref 3.5–5.3)
POTASSIUM SERPL-SCNC: 4 MMOL/L — SIGNIFICANT CHANGE UP (ref 3.5–5.3)
PROTHROM AB SERPL-ACNC: 12.7 SEC — SIGNIFICANT CHANGE UP (ref 10.6–13.6)
RBC # BLD: 4.78 M/UL — SIGNIFICANT CHANGE UP (ref 3.8–5.2)
RBC # FLD: 12.9 % — SIGNIFICANT CHANGE UP (ref 10.3–14.5)
SODIUM SERPL-SCNC: 136 MMOL/L — SIGNIFICANT CHANGE UP (ref 135–145)
WBC # BLD: 8.82 K/UL — SIGNIFICANT CHANGE UP (ref 3.8–10.5)
WBC # FLD AUTO: 8.82 K/UL — SIGNIFICANT CHANGE UP (ref 3.8–10.5)

## 2021-01-15 PROCEDURE — 93010 ELECTROCARDIOGRAM REPORT: CPT

## 2021-01-15 NOTE — PHYSICAL THERAPY INITIAL EVALUATION ADULT - MODIFIED CLINICAL TEST OF SENSORY INTEGRATION IN BALANCE TEST
30 second Sit to Stand Test: reps: 11. Functional assessment of lower extremity strength and ability to maintain balance in standing, discriminates those with low and high levels of functional activity. One Leg Stand Test (OLST): Right: 3. Functional test to assess fall risk. Both gait and stair negotiation require components of OLST. Participants unable to perform the OLST for at least 5 seconds are at increased risk for injurious fall.

## 2021-01-15 NOTE — PHYSICAL THERAPY INITIAL EVALUATION ADULT - ADDITIONAL COMMENTS
Patient endorses typical pain at best is 6/10, at worst is 8/10. Per patient, pain is worse with increased standing, sitting and stairs negotiation. Pain is relieved by taking tylenol or applying ice. Home set-up: lives with spouse in private house with 26 stair steps from garage to basement of house c left handrail (some sections with with handrails to enter at front. Bedroom is at 2nd floor with  stair steps to negotiate, x1 left handrail. Bathroom is a step-in shower/shower-tub combination with grab rails, a standard/comfort height toilet seat, with fixed/retractable shower head. Endorses will be supported by post-op. Patient is currently using  with mobility. Reports owns a . Patient  is -handed and drives; wears glasses always/for reading. Patient reports/denies falls in past 6 months. Reports/Denies buckling. Patient endorses typical pain at best is 6/10, at worst is 8/10. Per patient, pain is worse with increased standing, sitting and stairs negotiation. Pain is relieved by taking tylenol or applying ice. Home set-up: lives with spouse in private house with 26 stair steps from garage to basement of house c left handrail (some sections with landing and surfaces to handle for support) --> another 14 stair steps c left handrail to 1st level with living room without spare rooms & only a half bath --> another 14 stair steps c left handrail to 2nd level with bedroom and walk in shower bathroom without grab rails. Has a comfort height toilet seat, fixed shower head. Endorses will be supported by spouse post-op. Patient is currently independent with mobility, recently got a cane. Patient is right-handed and drives; wears glasses for reading/distance. Patient denies falls in past 6 months. Rarely priyanka.

## 2021-01-15 NOTE — OCCUPATIONAL THERAPY INITIAL EVALUATION ADULT - PERTINENT HX OF CURRENT PROBLEM, REHAB EVAL
Pt is a 61 y/o female slated for elective surgery for right TKR with MD De La Cruz on 1/25/21, due to chronic OA, pain and DJD. Pt reported buckling in her right knee, but denied any  falls in the past 3-6 months

## 2021-01-15 NOTE — OCCUPATIONAL THERAPY INITIAL EVALUATION ADULT - LIVES WITH, PROFILE
in a private house on a steep incline with 26 steps with intermittent land, left ascending handrail on the main flight. Once inside, pt has to negotiate a flight of stairs  with 14 steps left ascending to access living room . Pt has an addition 14 steps with left ascending handrail to get to the bedroom and bathroom. The bathroom has a walk in shower, fixed / retractable shower head, and comfort height toilet with adequate space to fit a commode over it./spouse in a private house on a steep incline with 26 steps with intermittent landings, left ascending handrail on the main flight to the basement. Once inside, pt has to negotiate a flight of stairs  with 14 steps left ascending to access living room. Pt has an additional 14 steps with left ascending handrail to get to the bedroom and bathroom. The bathroom has a walk in shower, fixed / retractable shower head, and comfort height toilet with adequate space to fit a commode over it./spouse

## 2021-01-15 NOTE — OCCUPATIONAL THERAPY INITIAL EVALUATION ADULT - GENERAL OBSERVATIONS, REHAB EVAL
Chart reviewed. Patient encountered seated in chair in rehab preop room in [facility]. Patient underwent occupational therapy pre-operative consultation to determine current functional ADL limitations in order to provide the right equipment for patient to perform functional ADL post operation.

## 2021-01-15 NOTE — H&P PST ADULT - NSICDXPASTSURGICALHX_GEN_ALL_CORE_FT
PAST SURGICAL HISTORY:  H/O abdominal hysterectomy     History of cholecystectomy     Inguinal hernia without obstruction or gangrene, recurrence not specified, unspecified laterality

## 2021-01-15 NOTE — PHYSICAL THERAPY INITIAL EVALUATION ADULT - PERTINENT HX OF CURRENT PROBLEM, REHAB EVAL
Patient attends Pre-Op Testing today following consult with Dr. De La Cruz due to chronic pain to right knee. Significant surgical/medical histories of chronic (R) hip pain. Elective RTKA is now scheduled in this facility for 1/25/21.

## 2021-01-15 NOTE — PHYSICAL THERAPY INITIAL EVALUATION ADULT - GENERAL OBSERVATIONS, REHAB EVAL
Patient encountered sitting in PST waiting area, agreeable to PT pre-op evaluation. Patient is for elective right total knee arthroplasty at a later date from now.

## 2021-01-15 NOTE — OCCUPATIONAL THERAPY INITIAL EVALUATION ADULT - ADDITIONAL COMMENTS
At this time, pt is functioning in her roles, self sufficient, driving & ambulating independently in the community without any assistive devices. Pt owns no DME . Pt c/o 6/10 pain in her right knee. The pain is exacerbated, by walking, prolonged standing, negotiating steps and is relieved with Tylenol or Advil. Pt is right hand dominant , wears glasses for reading an d distance . Pt scores 90% of patient specific scale

## 2021-01-15 NOTE — OCCUPATIONAL THERAPY INITIAL EVALUATION ADULT - RANGE OF MOTION EXAMINATION, LOWER EXTREMITY
ROM is limited in right knee due to pain./Left LE Active ROM was WNL  (within normal limits)/Left LE Passive ROM was WNL (within normal limits)/Right LE Active ROM was WFL   (within functional limits)/Right LE Passive ROM was WFL  (within functional limits)

## 2021-01-15 NOTE — H&P PST ADULT - HISTORY OF PRESENT ILLNESS
61 yo female, PMH- syncope in 2017, obese  c/o right knee pain secondary to osteoarthritis - scheduled for right knee arthroplasty    goal: to walk  without pain    denies recent travels in the past 30 days. No fever, SOB, cough, flu like symptoms or body rash- covid screen

## 2021-01-15 NOTE — PHYSICAL THERAPY INITIAL EVALUATION ADULT - DISCHARGE DISPOSITION, PT EVAL
Home c home PT c rolling walker, 3:1 commode, pending functional status post-operatively. Patient owns a cane.

## 2021-01-15 NOTE — H&P PST ADULT - NSICDXPASTMEDICALHX_GEN_ALL_CORE_FT
PAST MEDICAL HISTORY:  No pertinent past medical history     Sleep apnea no device used    Syncope 2017

## 2021-01-15 NOTE — H&P PST ADULT - ASSESSMENT
osteoarthritis right knee  CAPRINI SCORE    AGE RELATED RISK FACTORS                                                       MOBILITY RELATED FACTORS  [x ] Age 41-60 years                                            (1 Point)                  [ ] Bed rest                                                        (1 Point)  [ ] Age: 61-74 years                                           (2 Points)                [ ] Plaster cast                                                   (2 Points)  [ ] Age= 75 years                                              (3 Points)                 [ ] Bed bound for more than 72 hours                   (2 Points)    DISEASE RELATED RISK FACTORS                                               GENDER SPECIFIC FACTORS  [ ] Edema in the lower extremities                       (1 Point)                  [ ] Pregnancy                                                     (1 Point)  [ ] Varicose veins                                               (1 Point)                  [ ] Post-partum < 6 weeks                                   (1 Point)             [x ] BMI > 25 Kg/m2                                            (1 Point)                  [ ] Hormonal therapy  or oral contraception            (1 Point)                 [ ] Sepsis (in the previous month)                        (1 Point)                  [ ] History of pregnancy complications  [ ] Pneumonia or serious lung disease                                               [ ] Unexplained or recurrent                       (1 Point)           (in the previous month)                               (1 Point)  [ ] Abnormal pulmonary function test                     (1 Point)                 SURGERY RELATED RISK FACTORS  [ ] Acute myocardial infarction                              (1 Point)                 [ ]  Section                                            (1 Point)  [ ] Congestive heart failure (in the previous month)  (1 Point)                 [ ] Minor surgery                                                 (1 Point)   [ ] Inflammatory bowel disease                             (1 Point)                 [ ] Arthroscopic surgery                                        (2 Points)  [ ] Central venous access                                    (2 Points)                [ ] General surgery lasting more than 45 minutes   (2 Points)       [ ] Stroke (in the previous month)                          (5 Points)               [x ] Elective arthroplasty                                        (5 Points)                                                                                                                                               HEMATOLOGY RELATED FACTORS                                                 TRAUMA RELATED RISK FACTORS  [ ] Prior episodes of VTE                                     (3 Points)                 [ ] Fracture of the hip, pelvis, or leg                       (5 Points)  [ ] Positive family history for VTE                         (3 Points)                 [ ] Acute spinal cord injury (in the previous month)  (5 Points)  [ ] Prothrombin 99116 A                                      (3 Points)                 [ ] Paralysis  (less than 1 month)                          (5 Points)  [ ] Factor V Leiden                                             (3 Points)                 [ ] Multiple Trauma within 1 month                         (5 Points)  [ ] Lupus anticoagulants                                     (3 Points)                                                           [ ] Anticardiolipin antibodies                                (3 Points)                                                       [ ] High homocysteine in the blood                      (3 Points)                                             [ ] Other congenital or acquired thrombophilia       (3 Points)                                                [ ] Heparin induced thrombocytopenia                  (3 Points)                                          Total Score [         7 ]  Caprini Score 0-2: Low risk, No VTE Prophylaxis required for most patient's, encourage ambulation  Caprini Score 3-6: At Risk, Pharmacologic VTE prophylaxis is indicated for most patients ( in the absence of a contraindication)  Caprini Score Greater than or = 7: High Risk , pharmacologic VTE is indicated for most patients ( in the absence of a contraindication)    Caprini score indicates that the patient is high risk for VTE event ( score 6 or greater). Surgical patient's in this group will benefit from both pharmacologic prophylaxis and intermittent compression devices . Surgical team will determine the balance between VTE  risk and bleeding risk and other clinical considerations

## 2021-01-15 NOTE — OCCUPATIONAL THERAPY INITIAL EVALUATION ADULT - SOCIAL CONCERNS
Pt endorsed that her spouse will be able to assist her after discharge home post-operatively./Complex psychosocial needs/coping issues

## 2021-01-15 NOTE — H&P PST ADULT - NSICDXPROBLEM_GEN_ALL_CORE_FT
PROBLEM DIAGNOSES  Problem: Osteoarthritis of right knee  Assessment and Plan: scheduled for right knee arthroplasty    Problem: Preoperative examination  Assessment and Plan: labs - cbc,pt/ptt,bmp,t&s,nose cx,ekg  M/C required and cardiac   preop 3 day hibiclens instruction reviewed and given .instructed on if  nose cx positive use mupuricin 5 days and checklist given  take routine meds DOS with sips of water. avoid NSAID and OTC supplements. verbalized understanding  information on proper nutrition , increase protein and better food choices provided in packet   ensure clear given  patient instructed on having covid19 swab 3 days prior to surgery      Problem: Sleep apnea  Assessment and Plan: airway precautions

## 2021-01-16 LAB
MRSA PCR RESULT.: SIGNIFICANT CHANGE UP
S AUREUS DNA NOSE QL NAA+PROBE: SIGNIFICANT CHANGE UP

## 2021-01-21 DIAGNOSIS — Z01.818 ENCOUNTER FOR OTHER PREPROCEDURAL EXAMINATION: ICD-10-CM

## 2021-01-22 ENCOUNTER — APPOINTMENT (OUTPATIENT)
Dept: DISASTER EMERGENCY | Facility: CLINIC | Age: 61
End: 2021-01-22

## 2021-02-28 ENCOUNTER — TRANSCRIPTION ENCOUNTER (OUTPATIENT)
Age: 61
End: 2021-02-28

## 2021-03-05 NOTE — PHYSICAL THERAPY INITIAL EVALUATION ADULT - LEFT KNEE EXTENSION/FLEXION AROM, REHAB EVAL
EMERGENCY DEPARTMENT ENCOUNTER    Room Number:  05/05  Date of encounter:  3/5/2021  PCP: Harry Luis MD  Historian: Patient, daughter      I used full protective equipment while examining this patient.  This includes face mask, gloves and protective eyewear.  I washed my hands before entering the room and immediately upon leaving the room      HPI:  Chief Complaint: Lower extremity weakness  A complete HPI/ROS/PMH/PSH/SH/FH are unobtainable due to: Nothing    Context: Harry Potts is a 89 y.o. male who presents to the ED c/o 1 day history of bilateral lower extremity spasm, weakness, paresthesias.  Patient states when he woke this morning he was unable to ambulate to the bathroom.  Patient states he sat himself down on the ground.  Patient states he has not been able to ambulate since this morning.  Patient does have a history of chronic low back pain and uses a walker.  He states his symptoms today are much different than usual.  Patient states at rest he has no symptoms.  However when he stands up he complains of bilateral lower extremity weakness, paresthesias.  He denies any difficulty with his arms.  Denies any saddle paresthesias or incontinence.  Patient did receive his second Covid vaccine on 2/25/2021.    Patient also complains of chronic shortness of breath.  Patient has history of COPD.  He states his breathing is close to his baseline.    Review of Medical Records  I reviewed patient's last primary care visit from 12/9/2020.  Patient seen for hypertension, hypercholesterolemia, osteoarthritis of back.    PAST MEDICAL HISTORY  Active Ambulatory Problems     Diagnosis Date Noted   • Dysfunction of eustachian tube 04/06/2016   • Gastroesophageal reflux disease 04/06/2016   • Hyperglycemia 04/06/2016   • Hypercholesterolemia 04/06/2016   • Hypertension 04/06/2016   • Morbid obesity (CMS/[facility]) 04/06/2016   • Osteoarthritis of lumbar spine with myelopathy 04/06/2016   • Osteoarthritis of lumbar spine  04/06/2016   • Bronchitis 04/02/2018   • Viral URI with cough 05/01/2018   • PVC (premature ventricular contraction) 10/17/2019     Resolved Ambulatory Problems     Diagnosis Date Noted   • Cough 04/06/2016     Past Medical History:   Diagnosis Date   • GERD (gastroesophageal reflux disease)    • Hyperlipidemia    • Obesity    • Spondylolysis, lumbar region          PAST SURGICAL HISTORY  Past Surgical History:   Procedure Laterality Date   • EYE SURGERY     • HERNIA REPAIR     • REPLACEMENT TOTAL KNEE BILATERAL           FAMILY HISTORY  Family History   Family history unknown: Yes         SOCIAL HISTORY  Social History     Socioeconomic History   • Marital status:      Spouse name: Not on file   • Number of children: Not on file   • Years of education: Not on file   • Highest education level: Not on file   Tobacco Use   • Smoking status: Never Smoker   • Smokeless tobacco: Never Used   Substance and Sexual Activity   • Alcohol use: No   • Drug use: No         ALLERGIES  Morphine and related        REVIEW OF SYSTEMS  All systems reviewed and negative except for those discussed in HPI.       PHYSICAL EXAM    I have reviewed the triage vital signs and nursing notes.    ED Triage Vitals   Temp Heart Rate Resp BP SpO2   03/05/21 1338 03/05/21 1339 03/05/21 1337 03/05/21 1421 03/05/21 1339   96.5 °F (35.8 °C) 77 18 (!) 179/104 92 %      Temp src Heart Rate Source Patient Position BP Location FiO2 (%)   03/05/21 1338 -- 03/05/21 1421 03/05/21 1421 --   Tympanic  Lying Right arm        Physical Exam  GENERAL: Alert, oriented, not distressed  HENT: head atraumatic, no nuchal rigidity  EYES: no scleral icterus, EOMI  CV: regular rhythm with frequent extrasystole, regular rate, no murmur.  2+ DP and PT pulses bilaterally.  RESPIRATORY: normal effort, expiratory wheezing  ABDOMEN: soft, nontender, obese  MUSCULOSKELETAL: no deformity, FROM, no calf swelling or tenderness  NEURO: alert, 5/5 strength in lower extremities.   Sensation intact distally.  SKIN: warm, dry, hyperpigmentation of lower extremities        LAB RESULTS  Recent Results (from the past 24 hour(s))   Comprehensive Metabolic Panel    Collection Time: 03/05/21  2:30 PM    Specimen: Blood   Result Value Ref Range    Glucose 130 (H) 65 - 99 mg/dL    BUN 12 8 - 23 mg/dL    Creatinine 0.55 (L) 0.76 - 1.27 mg/dL    Sodium 139 136 - 145 mmol/L    Potassium 4.2 3.5 - 5.2 mmol/L    Chloride 101 98 - 107 mmol/L    CO2 29.0 22.0 - 29.0 mmol/L    Calcium 9.0 8.2 - 9.6 mg/dL    Total Protein 7.1 6.0 - 8.5 g/dL    Albumin 4.30 3.50 - 5.20 g/dL    ALT (SGPT) 33 1 - 41 U/L    AST (SGOT) 56 (H) 1 - 40 U/L    Alkaline Phosphatase 97 39 - 117 U/L    Total Bilirubin 0.6 0.0 - 1.2 mg/dL    eGFR Non African Amer 140 >60 mL/min/1.73    Globulin 2.8 gm/dL    A/G Ratio 1.5 g/dL    BUN/Creatinine Ratio 21.8 7.0 - 25.0    Anion Gap 9.0 5.0 - 15.0 mmol/L   CBC Auto Differential    Collection Time: 03/05/21  2:30 PM    Specimen: Blood   Result Value Ref Range    WBC 8.21 3.40 - 10.80 10*3/mm3    RBC 5.31 4.14 - 5.80 10*6/mm3    Hemoglobin 15.6 13.0 - 17.7 g/dL    Hematocrit 46.2 37.5 - 51.0 %    MCV 87.0 79.0 - 97.0 fL    MCH 29.4 26.6 - 33.0 pg    MCHC 33.8 31.5 - 35.7 g/dL    RDW 12.4 12.3 - 15.4 %    RDW-SD 39.0 37.0 - 54.0 fl    MPV 10.6 6.0 - 12.0 fL    Platelets 147 140 - 450 10*3/mm3    Neutrophil % 80.5 (H) 42.7 - 76.0 %    Lymphocyte % 11.6 (L) 19.6 - 45.3 %    Monocyte % 7.2 5.0 - 12.0 %    Eosinophil % 0.1 (L) 0.3 - 6.2 %    Basophil % 0.4 0.0 - 1.5 %    Immature Grans % 0.2 0.0 - 0.5 %    Neutrophils, Absolute 6.61 1.70 - 7.00 10*3/mm3    Lymphocytes, Absolute 0.95 0.70 - 3.10 10*3/mm3    Monocytes, Absolute 0.59 0.10 - 0.90 10*3/mm3    Eosinophils, Absolute 0.01 0.00 - 0.40 10*3/mm3    Basophils, Absolute 0.03 0.00 - 0.20 10*3/mm3    Immature Grans, Absolute 0.02 0.00 - 0.05 10*3/mm3    nRBC 0.0 0.0 - 0.2 /100 WBC   Light Blue Top    Collection Time: 03/05/21  2:30 PM   Result Value  Ref Range    Extra Tube hold for add-on    Green Top (Gel)    Collection Time: 03/05/21  2:30 PM   Result Value Ref Range    Extra Tube Hold for add-ons.    Lavender Top    Collection Time: 03/05/21  2:30 PM   Result Value Ref Range    Extra Tube hold for add-on    Gold Top - SST    Collection Time: 03/05/21  2:30 PM   Result Value Ref Range    Extra Tube Hold for add-ons.    Troponin    Collection Time: 03/05/21  2:30 PM    Specimen: Blood   Result Value Ref Range    Troponin T 0.014 0.000 - 0.030 ng/mL   Magnesium    Collection Time: 03/05/21  2:30 PM    Specimen: Blood   Result Value Ref Range    Magnesium 1.9 1.6 - 2.4 mg/dL   CK    Collection Time: 03/05/21  2:30 PM    Specimen: Blood   Result Value Ref Range    Creatine Kinase 1,406 (H) 20 - 200 U/L   Urinalysis With Microscopic If Indicated (No Culture) - Urine, Clean Catch    Collection Time: 03/05/21  2:35 PM    Specimen: Urine, Clean Catch   Result Value Ref Range    Color, UA Yellow Yellow, Straw    Appearance, UA Clear Clear    pH, UA 7.5 5.0 - 8.0    Specific Gravity, UA 1.017 1.005 - 1.030    Glucose, UA Negative Negative    Ketones, UA 15 mg/dL (1+) (A) Negative    Bilirubin, UA Negative Negative    Blood, UA Moderate (2+) (A) Negative    Protein,  mg/dL (2+) (A) Negative    Leuk Esterase, UA Negative Negative    Nitrite, UA Negative Negative    Urobilinogen, UA 1.0 E.U./dL 0.2 - 1.0 E.U./dL   Urinalysis, Microscopic Only - Urine, Clean Catch    Collection Time: 03/05/21  2:35 PM    Specimen: Urine, Clean Catch   Result Value Ref Range    RBC, UA 0-2 None Seen, 0-2 /HPF    WBC, UA 0-2 None Seen, 0-2 /HPF    Bacteria, UA None Seen None Seen /HPF    Squamous Epithelial Cells, UA 0-2 None Seen, 0-2 /HPF    Hyaline Casts, UA None Seen None Seen /LPF    Methodology Automated Microscopy    ECG 12 Lead    Collection Time: 03/05/21  3:09 PM   Result Value Ref Range    QT Interval 407 ms       Ordered the above labs and independently reviewed the  results.        RADIOLOGY  Ct Head Without Contrast    Result Date: 3/5/2021  CT HEAD WITHOUT CONTRAST  HISTORY: Bilateral leg weakness.  COMPARISON: None.  FINDINGS: The brain and ventricles are symmetrical. There is age-appropriate atrophy. Moderate to severe small vessel ischemic disease is appreciated. There is no evidence of hemorrhage or of a focal area of decreased attenuation to suggest acute infarction. Moderate vascular calcification is noted.      No evidence of hemorrhage or of acute infarction. Atrophy, small vessel ischemic disease and vascular calcification is noted as described above. Further evaluation could be performed with a MRI examination the brain as indicated.    Radiation dose reduction techniques were utilized, including automated exposure control and exposure modulation based on body size.  This report was finalized on 3/5/2021 3:29 PM by Dr. Klever Willingham M.D.      Xr Chest 1 View    Result Date: 3/5/2021  XR CHEST 1 VW-  3/5/2021  HISTORY: Weakness.  Heart size is within normal limits. Lungs are underinflated with some vascular crowding. No focal infiltrates are seen. No pneumothorax is seen.      No acute process.  This report was finalized on 3/5/2021 2:37 PM by Dr. Jimenez Rueda M.D.        I ordered the above noted radiological studies. Reviewed by me and discussed with radiologist.  See dictation for official radiology interpretation.    MEDICATIONS GIVEN IN ER    Medications   sodium chloride 0.9 % flush 10 mL (has no administration in time range)   sodium chloride 0.9 % infusion (has no administration in time range)         PROGRESS, DATA ANALYSIS, CONSULTS, AND MEDICAL DECISION MAKING    All labs have been independently reviewed by me.  All radiology studies have been reviewed by me and discussed with radiologist dictating the report.   EKG's independently viewed and interpreted by me.  Discussion below represents my analysis of pertinent findings related to patient's  condition, differential diagnosis, treatment plan and final disposition.    I have discussed case with Dr. Hoover, emergency room physician.  He has performed his own bedside examination and agrees with treatment plan.    ED Course as of Mar 05 1630   Fri Mar 05, 2021   1511 Patient presents with 1 day history of lower extremity weakness/paresthesias.  Patient has been unable to ambulate today.  Differential diagnoses include but not limited to rhabdomyolysis, lumbar radiculopathy, Guillain-Barré syndrome, other polyneuropathy.    [EE]   1511 WBC: 8.21 [EE]   1512 Hemoglobin: 15.6 [EE]   1512 Magnesium: 1.9 [EE]   1512 Creatinine(!): 0.55 [EE]   1512 Potassium: 4.2 [EE]   1512 Chest x-ray interpreted by myself shows no acute infiltrate or effusion.  I will await final radiologist interpretation.    [EE]   1512 EKG interpreted by myself.  Time 1509.  Sinus rhythm, 90 bpm with multiple PVCs.  Normal P with short RI interval.  QRS shows right bundle branch block.  No significant ST abnormalities.  No previous for comparison.    [EE]   1546 Creatine Kinase(!): 1,406 [EE]   1555 Patient does have elevated CK.  This certainly could explain patient's lower extremity spasms.  Patient has no neuro deficits on exam no objective weakness.  Patient cannot ambulate at this time.  We will bring patient in for rehydration, as well as further investigation into his weakness.    [EE]   1618 I discussed case with Dr. Rocha.  She agrees to admit the patient.    [EE]      ED Course User Index  [EE] Alton Shepard PA       AS OF 16:30 EST VITALS:    BP - (!) 179/104  HR - 77  TEMP - 96.5 °F (35.8 °C) (Tympanic)  O2 SATS - 92%        DIAGNOSIS  Final diagnoses:   Non-traumatic rhabdomyolysis   Weakness of both lower extremities         DISPOSITION  Admitted           Alton Shepard PA  03/05/21 1631     WFL (within functional limits)

## 2021-03-23 ENCOUNTER — EMERGENCY (EMERGENCY)
Facility: HOSPITAL | Age: 61
LOS: 0 days | Discharge: HOME | End: 2021-03-23
Attending: EMERGENCY MEDICINE
Payer: COMMERCIAL

## 2021-03-23 VITALS
DIASTOLIC BLOOD PRESSURE: 77 MMHG | HEART RATE: 76 BPM | TEMPERATURE: 98 F | OXYGEN SATURATION: 97 % | SYSTOLIC BLOOD PRESSURE: 148 MMHG | RESPIRATION RATE: 18 BRPM

## 2021-03-23 VITALS
HEART RATE: 80 BPM | SYSTOLIC BLOOD PRESSURE: 187 MMHG | DIASTOLIC BLOOD PRESSURE: 82 MMHG | RESPIRATION RATE: 18 BRPM | TEMPERATURE: 99 F | OXYGEN SATURATION: 98 % | HEIGHT: 66 IN

## 2021-03-23 DIAGNOSIS — K40.90 UNILATERAL INGUINAL HERNIA, WITHOUT OBSTRUCTION OR GANGRENE, NOT SPECIFIED AS RECURRENT: Chronic | ICD-10-CM

## 2021-03-23 DIAGNOSIS — G47.30 SLEEP APNEA, UNSPECIFIED: ICD-10-CM

## 2021-03-23 DIAGNOSIS — I10 ESSENTIAL (PRIMARY) HYPERTENSION: ICD-10-CM

## 2021-03-23 DIAGNOSIS — E11.9 TYPE 2 DIABETES MELLITUS WITHOUT COMPLICATIONS: ICD-10-CM

## 2021-03-23 DIAGNOSIS — R51.9 HEADACHE, UNSPECIFIED: ICD-10-CM

## 2021-03-23 DIAGNOSIS — Z90.49 ACQUIRED ABSENCE OF OTHER SPECIFIED PARTS OF DIGESTIVE TRACT: Chronic | ICD-10-CM

## 2021-03-23 DIAGNOSIS — Z90.710 ACQUIRED ABSENCE OF BOTH CERVIX AND UTERUS: Chronic | ICD-10-CM
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
## 2021-03-23 DIAGNOSIS — E78.5 HYPERLIPIDEMIA, UNSPECIFIED: ICD-10-CM

## 2021-03-23 PROCEDURE — 99283 EMERGENCY DEPT VISIT LOW MDM: CPT

## 2021-03-23 PROCEDURE — 99284 EMERGENCY DEPT VISIT MOD MDM: CPT

## 2021-03-23 RX ORDER — ACETAMINOPHEN 500 MG
650 TABLET ORAL ONCE
Refills: 0 | Status: COMPLETED | OUTPATIENT
Start: 2021-03-23 | End: 2021-03-23

## 2021-03-23 RX ORDER — IBUPROFEN 200 MG
600 TABLET ORAL ONCE
Refills: 0 | Status: COMPLETED | OUTPATIENT
Start: 2021-03-23 | End: 2021-03-23

## 2021-03-23 RX ADMIN — Medication 650 MILLIGRAM(S): at 17:21

## 2021-03-23 RX ADMIN — Medication 600 MILLIGRAM(S): at 17:21

## 2021-03-23 NOTE — ED STATDOCS - ENMT, MLM
Nasal mucosa clear.  Mouth with normal mucosa  Throat has no vesicles, no oropharyngeal exudates and uvula is midline. Dentition intact, no scalp tenderness, no step-off nor deformity, no hemotympanum,

## 2021-03-23 NOTE — ED STATDOCS - NS_ ATTENDINGSCRIBEDETAILS _ED_A_ED_FT
I, Luis Felipe Lloyd MD,  performed the initial face to face bedside interview with this patient regarding history of present illness, review of symptoms and relevant past medical, social and family history.  I completed an independent physical examination.    The history, relevant review of systems, past medical and surgical history, medical decision making, and physical examination was documented by the scribe in my presence and I attest to the accuracy of the documentation.
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
## 2021-03-23 NOTE — ED STATDOCS - PMH
HTN (hypertension)    No pertinent past medical history    Sleep apnea  no device used  Syncope  2017

## 2021-03-23 NOTE — ED STATDOCS - MUSCULOSKELETAL, MLM
range of motion is not limited and there is no muscle tenderness. Spine non-tender, full ROM of neck range of motion is not limited and there is no muscle tenderness. Spine non-tender, full ROM of neck, R knee nontender, no edema, no erythema, full ROM, no laxity
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
## 2021-03-23 NOTE — ED STATDOCS - PATIENT PORTAL LINK FT
You can access the FollowMyHealth Patient Portal offered by [facility] by registering at the following website: [email or web address]. By joining [employer]’s FollowMyHealth portal, you will also be able to view your health information using other applications (apps) compatible with our system.
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
## 2021-03-23 NOTE — ED STATDOCS - ATTENDING CONTRIBUTION TO CARE
I personally saw the patient with the PA, and completed the key components of the history and physical exam. I then discussed the management plan with the PA. I, Luis Felipe Lloyd MD,  performed the initial face to face bedside interview with this patient regarding history of present illness, review of symptoms and relevant past medical, social and family history.  I completed an independent physical examination.  I was the initial provider who evaluated this patient. I have signed out the follow up of any pending tests (i.e. labs, radiological studies) to the resident.  I have communicated the patient’s plan of care and disposition with the resident.

## 2021-03-24 PROBLEM — R55 SYNCOPE AND COLLAPSE: Chronic | Status: ACTIVE | Noted: 2021-01-15

## 2021-03-24 PROBLEM — G47.30 SLEEP APNEA, UNSPECIFIED: Chronic | Status: ACTIVE | Noted: 2021-01-15

## 2021-04-14 PROBLEM — I10 ESSENTIAL (PRIMARY) HYPERTENSION: Chronic | Status: ACTIVE | Noted: 2021-03-23

## 2021-04-21 ENCOUNTER — NON-APPOINTMENT (OUTPATIENT)
Age: 61
End: 2021-04-21

## 2021-04-21 DIAGNOSIS — Z86.39 PERSONAL HISTORY OF OTHER ENDOCRINE, NUTRITIONAL AND METABOLIC DISEASE: ICD-10-CM

## 2021-04-21 DIAGNOSIS — Z12.11 ENCOUNTER FOR SCREENING FOR MALIGNANT NEOPLASM OF COLON: ICD-10-CM

## 2021-04-21 DIAGNOSIS — Z83.438 FAMILY HISTORY OF OTHER DISORDER OF LIPOPROTEIN METABOLISM AND OTHER LIPIDEMIA: ICD-10-CM

## 2021-04-21 DIAGNOSIS — M54.2 CERVICALGIA: ICD-10-CM

## 2021-04-21 DIAGNOSIS — Z82.49 FAMILY HISTORY OF ISCHEMIC HEART DISEASE AND OTHER DISEASES OF THE CIRCULATORY SYSTEM: ICD-10-CM

## 2021-04-21 DIAGNOSIS — Z78.9 OTHER SPECIFIED HEALTH STATUS: ICD-10-CM

## 2021-04-21 DIAGNOSIS — M25.50 PAIN IN UNSPECIFIED JOINT: ICD-10-CM

## 2021-04-21 RX ORDER — CYCLOBENZAPRINE HYDROCHLORIDE 10 MG/1
10 TABLET, FILM COATED ORAL
Refills: 0 | Status: ACTIVE | COMMUNITY

## 2021-05-03 NOTE — H&P ADULT - PROBLEM/PLAN-3
Single view of the saphenous vein graft to the right coronary obtained using power injection.  occluded DISPLAY PLAN FREE TEXT

## 2021-05-11 ENCOUNTER — APPOINTMENT (OUTPATIENT)
Dept: FAMILY MEDICINE | Facility: CLINIC | Age: 61
End: 2021-05-11
Payer: COMMERCIAL

## 2021-05-11 DIAGNOSIS — R73.01 IMPAIRED FASTING GLUCOSE: ICD-10-CM

## 2021-05-11 PROCEDURE — 99072 ADDL SUPL MATRL&STAF TM PHE: CPT

## 2021-05-11 PROCEDURE — 36415 COLL VENOUS BLD VENIPUNCTURE: CPT

## 2021-05-12 DIAGNOSIS — Z86.79 PERSONAL HISTORY OF OTHER DISEASES OF THE CIRCULATORY SYSTEM: ICD-10-CM

## 2021-05-12 DIAGNOSIS — Z78.9 OTHER SPECIFIED HEALTH STATUS: ICD-10-CM

## 2021-05-13 LAB
ALBUMIN SERPL ELPH-MCNC: 4.6 G/DL
ALP BLD-CCNC: 80 U/L
ALT SERPL-CCNC: 13 U/L
ANION GAP SERPL CALC-SCNC: 16 MMOL/L
AST SERPL-CCNC: 11 U/L
BILIRUB SERPL-MCNC: 0.4 MG/DL
BUN SERPL-MCNC: 14 MG/DL
CALCIUM SERPL-MCNC: 9.6 MG/DL
CHLORIDE SERPL-SCNC: 99 MMOL/L
CHOLEST SERPL-MCNC: 292 MG/DL
CO2 SERPL-SCNC: 23 MMOL/L
CREAT SERPL-MCNC: 0.44 MG/DL
CREAT SPEC-SCNC: 110 MG/DL
ESTIMATED AVERAGE GLUCOSE: 206 MG/DL
GLUCOSE SERPL-MCNC: 197 MG/DL
HBA1C MFR BLD HPLC: 8.8 %
HDLC SERPL-MCNC: 50 MG/DL
LDLC SERPL CALC-MCNC: 177 MG/DL
MICROALBUMIN 24H UR DL<=1MG/L-MCNC: 5.1 MG/DL
MICROALBUMIN/CREAT 24H UR-RTO: 47 MG/G
NONHDLC SERPL-MCNC: 242 MG/DL
POTASSIUM SERPL-SCNC: 4.1 MMOL/L
PROT SERPL-MCNC: 7 G/DL
SODIUM SERPL-SCNC: 138 MMOL/L
TRIGL SERPL-MCNC: 327 MG/DL

## 2021-05-14 ENCOUNTER — APPOINTMENT (OUTPATIENT)
Dept: FAMILY MEDICINE | Facility: CLINIC | Age: 61
End: 2021-05-14
Payer: COMMERCIAL

## 2021-05-14 VITALS
WEIGHT: 250 LBS | OXYGEN SATURATION: 98 % | HEIGHT: 66 IN | TEMPERATURE: 97 F | SYSTOLIC BLOOD PRESSURE: 140 MMHG | DIASTOLIC BLOOD PRESSURE: 84 MMHG | BODY MASS INDEX: 40.18 KG/M2 | HEART RATE: 88 BPM

## 2021-05-14 PROCEDURE — 99072 ADDL SUPL MATRL&STAF TM PHE: CPT

## 2021-05-14 PROCEDURE — 99214 OFFICE O/P EST MOD 30 MIN: CPT

## 2021-05-14 NOTE — ASSESSMENT
[FreeTextEntry1] : HTN, SBP mildly elevated here but her home checks have shown good control of BPs on current regimen.\par \par DM/high chol are poorly controlled at this time despite her good efforts with  eating and wgt loss. \par \par OA-- she is working to lose wgt and will eventually need knee replacement surgery when she can safely do so once DM is well enough controlled (I'd like to see her A1C at least <=7.5%). She is following up with ortho/PT

## 2021-05-14 NOTE — PLAN
[FreeTextEntry1] : \par \par Revd recent labs at length and copy of labs w/ annotations given to her today. DM and chol levels show poor control. Revd DM and LDL/chol goals.  Also mildly elevated urine microalbumin. Rest of labs are wnl. \par \par Recommended trial of Metformin for DM and trial of statin med for cholesterol and also should consider trial of ACE-I med (she did not tolerate ARB med in past) given microalbuminuria. She prefers to limit to just one new med at a time and the highest immediate priority is to get DM under better control so I favor starting with metformin and we revd r/b/se and she is willing to try. Start with  mg daily and incr to 1000 mg daily (or 500 mg BID). \par \par Also offered that she can see endo for addtl eval/input/assistance with med mgmt of her DM. UTD on ophtho checks. \par \par Revd clean eating (eg Mediterranean style eating plan) and regular exercise/staying as physically active as possible.\par \par RTO 3 mos and we revd the need for close f/u until she gets DM/chol under good control and then we can space out visits. \par \par She will also f/u with Dr. De Paz as scheduled in near future and will review labs with him and do addtl cardiac testing and will see what he says about statin med. If she does start trial of statin med she is concerned about myalgias as she had this in the past with statin med and we disc that excellent hydration (64+ oz of water per day if poss) and coQ10 (100-400 mg daily) can help to signif reduce risk for myalgias.\par
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
## 2021-05-14 NOTE — PHYSICAL EXAM
[No Acute Distress] : no acute distress [Well Developed] : well developed [Well-Appearing] : well-appearing [Normal Sclera/Conjunctiva] : normal sclera/conjunctiva [EOMI] : extraocular movements intact [No JVD] : no jugular venous distention [Supple] : supple [Thyroid Normal, No Nodules] : the thyroid was normal and there were no nodules present [No Respiratory Distress] : no respiratory distress  [No Accessory Muscle Use] : no accessory muscle use [Clear to Auscultation] : lungs were clear to auscultation bilaterally [Normal Rate] : normal rate  [Regular Rhythm] : with a regular rhythm [Normal S1, S2] : normal S1 and S2 [No Murmur] : no murmur heard [No Varicosities] : no varicosities [Pedal Pulses Present] : the pedal pulses are present [No Edema] : there was no peripheral edema [No Extremity Clubbing/Cyanosis] : no extremity clubbing/cyanosis [Soft] : abdomen soft [Non Tender] : non-tender [Non-distended] : non-distended [Normal Bowel Sounds] : normal bowel sounds [No Joint Swelling] : no joint swelling [Grossly Normal Strength/Tone] : grossly normal strength/tone [No Rash] : no rash [Coordination Grossly Intact] : coordination grossly intact [No Focal Deficits] : no focal deficits [Normal Affect] : the affect was normal [Normal Insight/Judgement] : insight and judgment were intact [No Lymphadenopathy] : no lymphadenopathy [de-identified] : morbidly obese, well appearing  [de-identified] : +sclerotic joints but no s/sxs acute synovitis [de-identified] : walks with limp but steady gait
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
## 2021-05-26 RX ORDER — HYDROCHLOROTHIAZIDE 25 MG/1
25 TABLET ORAL DAILY
Qty: 90 | Refills: 3 | Status: ACTIVE | COMMUNITY

## 2021-06-01 ENCOUNTER — NON-APPOINTMENT (OUTPATIENT)
Age: 61
End: 2021-06-01

## 2021-06-21 RX ORDER — METFORMIN ER 500 MG 500 MG/1
500 TABLET ORAL
Qty: 180 | Refills: 3 | Status: DISCONTINUED | COMMUNITY
Start: 2021-05-14 | End: 2021-06-21

## 2021-06-29 ENCOUNTER — RX RENEWAL (OUTPATIENT)
Age: 61
End: 2021-06-29

## 2021-07-18 PROBLEM — E11.65 UNCONTROLLED TYPE 2 DIABETES MELLITUS WITH HYPERGLYCEMIA: Status: ACTIVE | Noted: 2021-05-11

## 2021-07-18 PROBLEM — M15.9 GENERALIZED OSTEOARTHRITIS OF MULTIPLE SITES: Status: ACTIVE | Noted: 2021-05-12

## 2021-07-20 ENCOUNTER — APPOINTMENT (OUTPATIENT)
Dept: FAMILY MEDICINE | Facility: CLINIC | Age: 61
End: 2021-07-20

## 2021-07-20 DIAGNOSIS — M15.9 POLYOSTEOARTHRITIS, UNSPECIFIED: ICD-10-CM

## 2021-07-20 DIAGNOSIS — E11.65 TYPE 2 DIABETES MELLITUS WITH HYPERGLYCEMIA: ICD-10-CM

## 2021-08-04 ENCOUNTER — APPOINTMENT (OUTPATIENT)
Dept: FAMILY MEDICINE | Facility: CLINIC | Age: 61
End: 2021-08-04

## 2021-08-11 ENCOUNTER — APPOINTMENT (OUTPATIENT)
Dept: FAMILY MEDICINE | Facility: CLINIC | Age: 61
End: 2021-08-11

## 2021-08-11 ENCOUNTER — NON-APPOINTMENT (OUTPATIENT)
Age: 61
End: 2021-08-11

## 2021-11-08 ENCOUNTER — RX RENEWAL (OUTPATIENT)
Age: 61
End: 2021-11-08

## 2021-11-23 ENCOUNTER — FORM ENCOUNTER (OUTPATIENT)
Age: 61
End: 2021-11-23

## 2022-02-17 ENCOUNTER — APPOINTMENT (OUTPATIENT)
Dept: FAMILY MEDICINE | Facility: CLINIC | Age: 62
End: 2022-02-17
Payer: COMMERCIAL

## 2022-02-17 VITALS
HEART RATE: 70 BPM | DIASTOLIC BLOOD PRESSURE: 90 MMHG | OXYGEN SATURATION: 97 % | SYSTOLIC BLOOD PRESSURE: 150 MMHG | HEIGHT: 66 IN | WEIGHT: 243 LBS | BODY MASS INDEX: 39.05 KG/M2 | TEMPERATURE: 97.4 F

## 2022-02-17 VITALS — SYSTOLIC BLOOD PRESSURE: 132 MMHG | RESPIRATION RATE: 14 BRPM | DIASTOLIC BLOOD PRESSURE: 78 MMHG

## 2022-02-17 DIAGNOSIS — E66.01 MORBID (SEVERE) OBESITY DUE TO EXCESS CALORIES: ICD-10-CM

## 2022-02-17 PROCEDURE — 99214 OFFICE O/P EST MOD 30 MIN: CPT | Mod: 25

## 2022-02-17 PROCEDURE — 36415 COLL VENOUS BLD VENIPUNCTURE: CPT

## 2022-02-17 RX ORDER — DAPAGLIFLOZIN 10 MG/1
10 TABLET, FILM COATED ORAL DAILY
Refills: 0 | Status: ACTIVE | COMMUNITY
Start: 2022-02-17

## 2022-02-17 NOTE — ASSESSMENT
[FreeTextEntry1] : DMT2/ obesity\par was started on Metformin last visit, however could not tolerated medication s/e and discontinued use\par she is seeing endocrinology Dr. Oro, Rx Farxiga which patient has been taking as prescribed and tolerating well, last ap.t 11/21 reports next scheduled apt is for next month and will be recheck a1c then.   \par in no acute distress and o/w offers no complaints \par a1c 11/21 was 9.7, goal is <7\par BMI improving \par \par htn/ hld\par on Amlodipine and HCTZ\par has CoQ10 at home however non- compliant with use\par she tries to be compliant with lifestyle modification low chol/ low fat diet and reports daily walking \par cardiology Dr. De Paz, last visit was 05/2021, denies having completed stress/ echo, completed ECG- no changes, reports was rec'd for RTO in 1 yr, soner if needed. \par in no acute distress and o/w offers no complaints \par medication renewal Amlodipine provided as requested \par check fasting lipid and cmp today - "labs drawn in office" \par \par preventative health\par annual wellness- scheduling \par Tdap/ shingles- refused \par colonoscopy- 2016 polyp, needs rpt. \par ophthalmology dilated eye exam- 05/2021\par GYN for Pap- no longer completes \par mammo- scheduling\par DEXA- 01/17, Low bone density, rec'd rpt in 2-3 years, not currently taking VitD or Calcium \par continued follow up with PCP for chronic concerns and preventative health management. \par patient verbalized understanding and agrees with plan of care.

## 2022-02-17 NOTE — COUNSELING
[Fall prevention counseling provided] : Fall prevention counseling provided [Adequate lighting] : Adequate lighting [No throw rugs] : No throw rugs [Use proper foot wear] : Use proper foot wear [Use recommended devices] : Use recommended devices [Behavioral health counseling provided] : Behavioral health counseling provided [Sleep ___ hours/day] : Sleep [unfilled] hours/day [Engage in a relaxing activity] : Engage in a relaxing activity [Plan in advance] : Plan in advance [None] : None [Good understanding] : Patient has a good understanding of lifestyle changes and steps needed to achieve self management goal [de-identified] : Maintain balanced diet of whole grain, fruits and vegetables, lean meats, skinless poultry, fish, beans, soy products, eggs, nuts, and low fat dairy as per FDA dietary guidelines. \par Avoid high calorie/low nutrient foods. \par vegetables/fruits- at least 5 servings/day, \par whole grains- 100% whole grain and at least 3 grams fiber/day.\par reduce/eliminate saturated fat- less than 5% on nutrition labels (ex. kruse, deli meat, hot dogs, fried foods, cookies, ice cream, chips, soft drinks, etc.)\par stay within your FDA recommended daily calorie needs or use the plate method to portion intake. \par Avoid fast food and limit eating out. When eating out choose healthy alternatives (ex. grilled, baked, steamed. low fat dressings. avoid french fries).\par DO NOT CONSUME FOODS YOU ARE ALLERGIC TO DESPITE DIETARY RECOMMENDATIONS.\par \par For more information you can visit www.Health.gov \par \par Incorporate regular physical activity most days of the week. \par Regular aerobic activity- moderate intensity, most days/wk, at least 30min/day- ex. brisk walk 2.5miles/hr, ballroom dancing, water aerobics, light yard work (raking/lawn mowing) actively playing basketball, biking 10miles/hr.\par Muscle strengthening and strength/resistance exercises 2-3days/wk- ex. free weights, resistance bands, your own weight (squats/pull-ups/push-ups).\par Neuro-motor exercises for balance/agility/coordination and flexibility exercises 2-3days/wk, 20-30min/day- ex. yoga and sony-chi.\par Bone strengthening at least 30min/day, most days of the week- ex. weights, resistance bands, running, brisk walking, jumping rope, stair climbing, tennis.\par Decrease sedentary time. \par LISTEN TO YOUR BODY AND MODIFY ACTIVITY AS NEEDED- DO NOT OVEREXERT YOURSELF DURING PHYSICAL ACTIVITY DESPITE RECOMMENDATION.\par \par For more information you can visit www.Health.gov \par \par Foot care: check feet daily and wash with mild soap and lukewarm water. \par Use lotions (as directed by podiatrist).\par File/clip toe nails after washing (as directed by podiatrist).\par Do not tear calluses. \par Use clean socks with well-fitted shoes.\par Do not go bare foot and do not cross legs. \par you should follow-up with a podiatrist yearly for maintenance of your feet.\par \par Eye care- you should follow-up with an ophthalmologist/optometrist yearly to screen for retinal damage and other complications that may occur secondary to diabetes. \par Bring the paperwork provided to you today to your visit to have the doctor complete and have the paperwork returned to our office.\par \par For more information you can visit www.medlineplus.gov  \par

## 2022-02-17 NOTE — HEALTH RISK ASSESSMENT
[Never] : Never [No] : In the past 12 months have you used drugs other than those required for medical reasons? No [No falls in past year] : Patient reported no falls in the past year [0] : 2) Feeling down, depressed, or hopeless: Not at all (0) [PHQ-2 Negative - No further assessment needed] : PHQ-2 Negative - No further assessment needed [Patient/Caregiver not ready to engage] : , patient/caregiver not ready to engage [de-identified] : walking daily, not exercising- counseled on this  [de-identified] : tries to eat low fat/ low chol/ DASH  [[id number]] : 0 [AdvancecareDate] : 02/22

## 2022-02-17 NOTE — PHYSICAL EXAM
[No Acute Distress] : no acute distress [Well Nourished] : well nourished [Well Developed] : well developed [No Respiratory Distress] : no respiratory distress  [No Accessory Muscle Use] : no accessory muscle use [Clear to Auscultation] : lungs were clear to auscultation bilaterally [Normal] : normal rate, regular rhythm, normal S1 and S2 and no murmur heard [No Carotid Bruits] : no carotid bruits [No Edema] : there was no peripheral edema [Soft] : abdomen soft [Non Tender] : non-tender [Non-distended] : non-distended [No Rash] : no rash [Coordination Grossly Intact] : coordination grossly intact [Normal Affect] : the affect was normal [Normal Insight/Judgement] : insight and judgment were intact [de-identified] : central obesity limits physical exam  [de-identified] : slight limp, o/w stable gait
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
## 2022-02-17 NOTE — HISTORY OF PRESENT ILLNESS
[FreeTextEntry1] : here for f/u htn and med renewal  [de-identified] : this is a 60yo pmhx htn on HCTZ & Amlodipine, cardiology Dr. De Paz/ hld/ DMT2 on Farxiga, endocrinology Dr. Oro/ OA w chronic neck/ jo/ knee pain, here for f/u chronic concerns and med renewal.\par o/w in no acute distress, no other major concerns and reports feeling well. \par will evaluate and manage as appropriate.

## 2022-02-18 LAB
ALBUMIN SERPL ELPH-MCNC: 4.7 G/DL
ALP BLD-CCNC: 87 U/L
ALT SERPL-CCNC: 17 U/L
ANION GAP SERPL CALC-SCNC: 16 MMOL/L
AST SERPL-CCNC: 14 U/L
BILIRUB SERPL-MCNC: 0.4 MG/DL
BUN SERPL-MCNC: 12 MG/DL
CALCIUM SERPL-MCNC: 10.3 MG/DL
CHLORIDE SERPL-SCNC: 100 MMOL/L
CHOLEST SERPL-MCNC: 308 MG/DL
CO2 SERPL-SCNC: 24 MMOL/L
CREAT SERPL-MCNC: 0.46 MG/DL
GLUCOSE SERPL-MCNC: 150 MG/DL
HDLC SERPL-MCNC: 58 MG/DL
LDLC SERPL CALC-MCNC: 193 MG/DL
NONHDLC SERPL-MCNC: 249 MG/DL
POTASSIUM SERPL-SCNC: 4.6 MMOL/L
PROT SERPL-MCNC: 7.8 G/DL
SODIUM SERPL-SCNC: 140 MMOL/L
TRIGL SERPL-MCNC: 284 MG/DL

## 2022-02-19 LAB
CHOLEST SERPL-MCNC: 320 MG/DL
HDLC SERPL-MCNC: 59 MG/DL
LDLC SERPL CALC-MCNC: 203 MG/DL
NONHDLC SERPL-MCNC: 261 MG/DL
TRIGL SERPL-MCNC: 290 MG/DL

## 2022-02-23 ENCOUNTER — NON-APPOINTMENT (OUTPATIENT)
Age: 62
End: 2022-02-23

## 2022-04-19 ENCOUNTER — EMERGENCY (EMERGENCY)
Facility: HOSPITAL | Age: 62
LOS: 0 days | Discharge: HOME | End: 2022-04-19
Attending: EMERGENCY MEDICINE
Payer: COMMERCIAL

## 2022-04-19 VITALS
RESPIRATION RATE: 18 BRPM | OXYGEN SATURATION: 97 % | TEMPERATURE: 98 F | DIASTOLIC BLOOD PRESSURE: 78 MMHG | SYSTOLIC BLOOD PRESSURE: 152 MMHG | HEART RATE: 66 BPM

## 2022-04-19 VITALS — HEIGHT: 66 IN | WEIGHT: 240.08 LBS

## 2022-04-19 DIAGNOSIS — Z90.49 ACQUIRED ABSENCE OF OTHER SPECIFIED PARTS OF DIGESTIVE TRACT: Chronic | ICD-10-CM

## 2022-04-19 DIAGNOSIS — Z90.710 ACQUIRED ABSENCE OF BOTH CERVIX AND UTERUS: Chronic | ICD-10-CM

## 2022-04-19 DIAGNOSIS — R55 SYNCOPE AND COLLAPSE: ICD-10-CM

## 2022-04-19 DIAGNOSIS — K40.90 UNILATERAL INGUINAL HERNIA, WITHOUT OBSTRUCTION OR GANGRENE, NOT SPECIFIED AS RECURRENT: Chronic | ICD-10-CM

## 2022-04-19 DIAGNOSIS — I10 ESSENTIAL (PRIMARY) HYPERTENSION: ICD-10-CM

## 2022-04-19 DIAGNOSIS — R42 DIZZINESS AND GIDDINESS: ICD-10-CM

## 2022-04-19 LAB
ALBUMIN SERPL ELPH-MCNC: 3.3 G/DL — SIGNIFICANT CHANGE UP (ref 3.3–5)
ALP SERPL-CCNC: 81 U/L — SIGNIFICANT CHANGE UP (ref 40–120)
ALT FLD-CCNC: 22 U/L — SIGNIFICANT CHANGE UP (ref 12–78)
ANION GAP SERPL CALC-SCNC: 6 MMOL/L — SIGNIFICANT CHANGE UP (ref 5–17)
APPEARANCE UR: CLEAR — SIGNIFICANT CHANGE UP
AST SERPL-CCNC: 15 U/L — SIGNIFICANT CHANGE UP (ref 15–37)
BASOPHILS # BLD AUTO: 0.06 K/UL — SIGNIFICANT CHANGE UP (ref 0–0.2)
BASOPHILS NFR BLD AUTO: 0.7 % — SIGNIFICANT CHANGE UP (ref 0–2)
BILIRUB SERPL-MCNC: 0.2 MG/DL — SIGNIFICANT CHANGE UP (ref 0.2–1.2)
BILIRUB UR-MCNC: NEGATIVE — SIGNIFICANT CHANGE UP
BUN SERPL-MCNC: 15 MG/DL — SIGNIFICANT CHANGE UP (ref 7–23)
CALCIUM SERPL-MCNC: 9.7 MG/DL — SIGNIFICANT CHANGE UP (ref 8.5–10.1)
CHLORIDE SERPL-SCNC: 107 MMOL/L — SIGNIFICANT CHANGE UP (ref 96–108)
CO2 SERPL-SCNC: 25 MMOL/L — SIGNIFICANT CHANGE UP (ref 22–31)
COLOR SPEC: YELLOW — SIGNIFICANT CHANGE UP
CREAT SERPL-MCNC: 0.58 MG/DL — SIGNIFICANT CHANGE UP (ref 0.5–1.3)
DIFF PNL FLD: NEGATIVE — SIGNIFICANT CHANGE UP
EGFR: 104 ML/MIN/1.73M2 — SIGNIFICANT CHANGE UP
EOSINOPHIL # BLD AUTO: 0.17 K/UL — SIGNIFICANT CHANGE UP (ref 0–0.5)
EOSINOPHIL NFR BLD AUTO: 2.1 % — SIGNIFICANT CHANGE UP (ref 0–6)
GLUCOSE SERPL-MCNC: 217 MG/DL — HIGH (ref 70–99)
GLUCOSE UR QL: 1000 MG/DL
HCT VFR BLD CALC: 41.2 % — SIGNIFICANT CHANGE UP (ref 34.5–45)
HGB BLD-MCNC: 13.2 G/DL — SIGNIFICANT CHANGE UP (ref 11.5–15.5)
IMM GRANULOCYTES NFR BLD AUTO: 0.2 % — SIGNIFICANT CHANGE UP (ref 0–1.5)
KETONES UR-MCNC: ABNORMAL
LEUKOCYTE ESTERASE UR-ACNC: NEGATIVE — SIGNIFICANT CHANGE UP
LYMPHOCYTES # BLD AUTO: 3.01 K/UL — SIGNIFICANT CHANGE UP (ref 1–3.3)
LYMPHOCYTES # BLD AUTO: 37.4 % — SIGNIFICANT CHANGE UP (ref 13–44)
MCHC RBC-ENTMCNC: 27.2 PG — SIGNIFICANT CHANGE UP (ref 27–34)
MCHC RBC-ENTMCNC: 32 GM/DL — SIGNIFICANT CHANGE UP (ref 32–36)
MCV RBC AUTO: 84.9 FL — SIGNIFICANT CHANGE UP (ref 80–100)
MONOCYTES # BLD AUTO: 0.68 K/UL — SIGNIFICANT CHANGE UP (ref 0–0.9)
MONOCYTES NFR BLD AUTO: 8.4 % — SIGNIFICANT CHANGE UP (ref 2–14)
NEUTROPHILS # BLD AUTO: 4.11 K/UL — SIGNIFICANT CHANGE UP (ref 1.8–7.4)
NEUTROPHILS NFR BLD AUTO: 51.2 % — SIGNIFICANT CHANGE UP (ref 43–77)
NITRITE UR-MCNC: NEGATIVE — SIGNIFICANT CHANGE UP
PH UR: 5 — SIGNIFICANT CHANGE UP (ref 5–8)
PLATELET # BLD AUTO: 287 K/UL — SIGNIFICANT CHANGE UP (ref 150–400)
POTASSIUM SERPL-MCNC: 4 MMOL/L — SIGNIFICANT CHANGE UP (ref 3.5–5.3)
POTASSIUM SERPL-SCNC: 4 MMOL/L — SIGNIFICANT CHANGE UP (ref 3.5–5.3)
PROT SERPL-MCNC: 7.2 GM/DL — SIGNIFICANT CHANGE UP (ref 6–8.3)
PROT UR-MCNC: NEGATIVE — SIGNIFICANT CHANGE UP
RBC # BLD: 4.85 M/UL — SIGNIFICANT CHANGE UP (ref 3.8–5.2)
RBC # FLD: 13.5 % — SIGNIFICANT CHANGE UP (ref 10.3–14.5)
SODIUM SERPL-SCNC: 138 MMOL/L — SIGNIFICANT CHANGE UP (ref 135–145)
SP GR SPEC: 1.02 — SIGNIFICANT CHANGE UP (ref 1.01–1.02)
TROPONIN I, HIGH SENSITIVITY RESULT: <3 NG/L — SIGNIFICANT CHANGE UP
UROBILINOGEN FLD QL: NEGATIVE — SIGNIFICANT CHANGE UP
WBC # BLD: 8.05 K/UL — SIGNIFICANT CHANGE UP (ref 3.8–10.5)
WBC # FLD AUTO: 8.05 K/UL — SIGNIFICANT CHANGE UP (ref 3.8–10.5)

## 2022-04-19 PROCEDURE — 80053 COMPREHEN METABOLIC PANEL: CPT

## 2022-04-19 PROCEDURE — 36415 COLL VENOUS BLD VENIPUNCTURE: CPT

## 2022-04-19 PROCEDURE — 81003 URINALYSIS AUTO W/O SCOPE: CPT

## 2022-04-19 PROCEDURE — 70498 CT ANGIOGRAPHY NECK: CPT | Mod: 26,MA

## 2022-04-19 PROCEDURE — 93010 ELECTROCARDIOGRAM REPORT: CPT

## 2022-04-19 PROCEDURE — 70496 CT ANGIOGRAPHY HEAD: CPT | Mod: 26,MA

## 2022-04-19 PROCEDURE — 93005 ELECTROCARDIOGRAM TRACING: CPT

## 2022-04-19 PROCEDURE — 71046 X-RAY EXAM CHEST 2 VIEWS: CPT | Mod: 26

## 2022-04-19 PROCEDURE — 70496 CT ANGIOGRAPHY HEAD: CPT | Mod: MA

## 2022-04-19 PROCEDURE — 99285 EMERGENCY DEPT VISIT HI MDM: CPT

## 2022-04-19 PROCEDURE — 85025 COMPLETE CBC W/AUTO DIFF WBC: CPT

## 2022-04-19 PROCEDURE — 70498 CT ANGIOGRAPHY NECK: CPT | Mod: MA

## 2022-04-19 PROCEDURE — 99285 EMERGENCY DEPT VISIT HI MDM: CPT | Mod: 25

## 2022-04-19 PROCEDURE — 71046 X-RAY EXAM CHEST 2 VIEWS: CPT

## 2022-04-19 PROCEDURE — 82962 GLUCOSE BLOOD TEST: CPT

## 2022-04-19 PROCEDURE — 84484 ASSAY OF TROPONIN QUANT: CPT

## 2022-04-19 NOTE — ED STATDOCS - NSICDXPASTMEDICALHX_GEN_ALL_CORE_FT
PAST MEDICAL HISTORY:  HTN (hypertension)     No pertinent past medical history     Sleep apnea no device used    Syncope 2017

## 2022-04-19 NOTE — ED STATDOCS - CARE PROVIDER_API CALL
Deanna Fragoso)  Family Medicine  [address] [place] [address]  Phone: [phone]  Fax: [phone]  Established Patient  Follow Up Time:

## 2022-04-19 NOTE — ED STATDOCS - PROGRESS NOTE DETAILS
signed Maura Castillo PA-C Pt seen initially in intake by Dr Dia.   62F c/o near syncope/tunnel vision while she was at work PTA. Event lasted about 30 minutes. Pt denies CP/SOB. Pt feeling better now, but not back to normal yet. Pt had episode of syncope 3 years ago and was hospitalized for workup. Had card Dr Callahan, last seen over 1 yr ago. Plan labs, CTA, as pt notes she was holding her phone up against her shoulder when this happened. No significant findings on EKG. PMD Deanna Fragoso. Pt agrees with plan of  care. signed Maura Castillo PA-C   No significant findings on labs, imaging, or EKG. Recommend pt f/u with PMD and card this week. return precautions given. Pt feeling well, pt and family agree with DC and plan of care.

## 2022-04-19 NOTE — ED STATDOCS - OBJECTIVE STATEMENT
63 y/o female with a PMHx of HTN, sleep apnea, syncope presents to the ED for near syncope. Pt reports she was at work when she started to experience tunnel vision and felt like she was going to pass out. +HA. Pt was hospitalized 3 years ago for syncope and followed up with cardio. Pt last saw her cardiologist 1 year ago. Denies CP, SOB, leg swelling, fevers, chills, room spinning sensation, numbness, tingling, weakness. Normal PO intake. No other complaints at this time. PCP: Dr. Deanna Fragoso. Cardio: Dr. De Paz.

## 2022-04-19 NOTE — ED ADULT NURSE NOTE - CHIEF COMPLAINT QUOTE
Pt presents to the ED s/p pre syncopal episode while at work PTA. Pt states she experienced tunnel vision and felt like she was going to pass out. Denies LOC. Pt reports headache and lightheadedness. Denies cp, sob, visual changes, weakness, numbness/tingling.  in triage. Sent in for stat EKG as per dr. eBba quach stroke eval.

## 2022-04-19 NOTE — ED STATDOCS - NSFOLLOWUPINSTRUCTIONS_ED_ALL_ED_FT
FOLLOW UP WITH YOUR CARDIOLOGIST AND PRIMARY DOCTOR IN 1-2 DAYS. RETURN TO THE ER FOR ANY WORSENING SYMPTOMS OR NEW CONCERNS.     Near-Syncope       Near-syncope is when you suddenly feel like you might pass out (faint), but you do not actually lose consciousness. This may also be referred to as presyncope. During an episode of near-syncope, you may:  •Feel dizzy, weak, or light-headed.      •Feel nauseous.      •See all white or all black in your field of vision, or see spots.      •Have cold, clammy skin.      This condition is caused by a sudden decrease in blood flow to the brain. This decrease can result from various causes, but most of those causes are not dangerous. However, near-syncope may be a sign of a serious medical problem, so it is important to seek medical care.      Follow these instructions at home:    Medicines     •Take over-the-counter and prescription medicines only as told by your health care provider.      •If you are taking blood pressure or heart medicine, get up slowly and take several minutes to sit and then stand. This can reduce dizziness.      General instructions     •Pay attention to any changes in your symptoms.      •Talk with your health care provider about your symptoms. You may need to have testing to understand the cause of your near-syncope.      •If you start to feel like you might faint, lie down right away and raise (elevate) your feet above the level of your heart. Breathe deeply and steadily. Wait until all of the symptoms have passed.      •Have someone stay with you until you feel stable.      • Do not drive, use machinery, or play sports until your health care provider says it is okay.      •Drink enough fluid to keep your urine pale yellow.      •Keep all follow-up visits as told by your health care provider. This is important.        Get help right away if you:    •Have a seizure.      •Have unusual pain in your chest, abdomen, or back.      •Faint once or repeatedly.      •Have a severe headache.      •Are bleeding from your mouth or rectum, or you have black or tarry stool.      •Have a very fast or irregular heartbeat (palpitations).      •Are confused.      •Have trouble walking.      •Have severe weakness.      •Have vision problems.      These symptoms may represent a serious problem that is an emergency. Do not wait to see if your symptoms will go away. Get medical help right away. Call your local emergency services (880 in the [place] Do not drive yourself to the hospital.       Summary    •Near-syncope is when you suddenly feel like you might pass out (faint), but you do not actually lose consciousness.      •This condition is caused by a sudden decrease in blood flow to the brain. This decrease can result from various causes, but most of those causes are not dangerous.      •Near-syncope may be a sign of a serious medical problem, so it is important to seek medical care.      This information is not intended to replace advice given to you by your health care provider. Make sure you discuss any questions you have with your health care provider.      Document Revised: 04/10/2020 Document Reviewed: 11/06/2019    Elsevier Patient Education © 2022 [employer] [phone]

## 2022-04-19 NOTE — ED ADULT NURSE NOTE - OBJECTIVE STATEMENT
sp dizziness and near syncope episode at work today. pt. denies Chest pain, SOB, nausea, vomiting, diarrhea, fevers, chills or sick contact. c/o slight neck pain and headache but with PMH migraines. no distress noted at this moment./

## 2022-04-19 NOTE — ED ADULT TRIAGE NOTE - CHIEF COMPLAINT QUOTE
surgery
Pt presents to the ED s/p pre syncopal episode while at work PTA. Pt states she experienced tunnel vision and felt like she was going to pass out. Denies LOC. Pt reports headache and lightheadedness. Denies cp, sob, visual changes, weakness, numbness/tingling.  in triage. Sent in for stat EKG as per dr. Beba quach stroke eval.

## 2022-04-19 NOTE — ED STATDOCS - CLINICAL SUMMARY MEDICAL DECISION MAKING FREE TEXT BOX
Pt with workup for syncope in the past. Pt did not fully syncopize today. Will check cardiac enzymes, cardiac monitor and reassess. Pt with workup for syncope in the past few years. Pt did not fully syncopize today.  No symptoms suggestive of vertigo.  Benign neuro exam.  EKG completely unchanged from baseline. Will check cardiac enzymes, cardiac monitor and reassess.

## 2022-04-19 NOTE — ED ADULT NURSE NOTE - NSIMPLEMENTINTERV_GEN_ALL_ED
Implemented All Universal Safety Interventions:  [place] to call system. Call bell, personal items and telephone within reach. Instruct patient to call for assistance. Room bathroom lighting operational. Non-slip footwear when patient is off stretcher. Physically safe environment: no spills, clutter or unnecessary equipment. Stretcher in lowest position, wheels locked, appropriate side rails in place.

## 2022-04-19 NOTE — ED STATDOCS - PATIENT PORTAL LINK FT
You can access the FollowMyHealth Patient Portal offered by [facility] by registering at the following website: [email or web address]. By joining [employer]’s FollowMyHealth portal, you will also be able to view your health information using other applications (apps) compatible with our system.

## 2022-05-11 ENCOUNTER — NON-APPOINTMENT (OUTPATIENT)
Age: 62
End: 2022-05-11

## 2022-05-11 ENCOUNTER — FORM ENCOUNTER (OUTPATIENT)
Age: 62
End: 2022-05-11

## 2022-06-13 ENCOUNTER — APPOINTMENT (OUTPATIENT)
Dept: FAMILY MEDICINE | Facility: CLINIC | Age: 62
End: 2022-06-13
Payer: COMMERCIAL

## 2022-06-13 VITALS
BODY MASS INDEX: 38.57 KG/M2 | DIASTOLIC BLOOD PRESSURE: 82 MMHG | HEART RATE: 77 BPM | SYSTOLIC BLOOD PRESSURE: 138 MMHG | WEIGHT: 240 LBS | TEMPERATURE: 97.5 F | HEIGHT: 66 IN | OXYGEN SATURATION: 96 %

## 2022-06-13 VITALS — SYSTOLIC BLOOD PRESSURE: 136 MMHG | DIASTOLIC BLOOD PRESSURE: 78 MMHG

## 2022-06-13 DIAGNOSIS — E78.2 MIXED HYPERLIPIDEMIA: ICD-10-CM

## 2022-06-13 DIAGNOSIS — I10 ESSENTIAL (PRIMARY) HYPERTENSION: ICD-10-CM

## 2022-06-13 DIAGNOSIS — H81.10 BENIGN PAROXYSMAL VERTIGO, UNSPECIFIED EAR: ICD-10-CM

## 2022-06-13 DIAGNOSIS — Z76.0 ENCOUNTER FOR ISSUE OF REPEAT PRESCRIPTION: ICD-10-CM

## 2022-06-13 PROCEDURE — 99214 OFFICE O/P EST MOD 30 MIN: CPT | Mod: 25

## 2022-06-13 PROCEDURE — 36415 COLL VENOUS BLD VENIPUNCTURE: CPT

## 2022-06-13 RX ORDER — BLOOD SUGAR DIAGNOSTIC
STRIP MISCELLANEOUS
Qty: 50 | Refills: 0 | Status: ACTIVE | COMMUNITY
Start: 2022-06-09

## 2022-06-13 RX ORDER — MEMANTINE HYDROCHLORIDE 5 MG/1
5 TABLET, FILM COATED ORAL
Qty: 180 | Refills: 0 | Status: ACTIVE | COMMUNITY
Start: 2021-10-06

## 2022-06-13 RX ORDER — MEMANTINE HYDROCHLORIDE 10 MG/1
10 TABLET, FILM COATED ORAL
Qty: 30 | Refills: 0 | Status: ACTIVE | COMMUNITY
Start: 2022-06-09

## 2022-06-13 NOTE — HISTORY OF PRESENT ILLNESS
[FreeTextEntry1] : here for f/u htn and med renewal  [de-identified] : this is a 62yo pmhx htn on HCTZ & Amlodipine, cardiology Dr. De Paz/ hld/ DMT2 on Farxiga, endocrinology Dr. Oro/ OA w chronic neck/ jo/ knee pain, here for f/u BP check and med renewal.\par since last visit she has been active, eating low fat/ low chol. reports + wt loss, wt loss noted from 02/22 BMI now 38.74, she has been compliant with Diabetes mgmt, reports Dr. Oro has left [facility] and last f/u was with NP. she will possibly be pursuing new endocrinologist, however no changes for now.\par had 1 episode syncope some time in 05/22, followed up with Dr. De Paz, plan was for stress/ echo however she had COVID19 and could not complete diagnostics. she is now recovered from COVID19, denies residual and plan is to reschedule stress/echo, agrees to keep pcp informed.\par o/w she is attending PT, she is considered restarting vestibular therapy for intermittent BPPV sx, chronic hx of and manages well conservatively. \par o/w in no acute distress, no other major concerns and reports feeling well. \par will evaluate and manage as appropriate.

## 2022-06-13 NOTE — ASSESSMENT
[FreeTextEntry1] : htn/ hld\par BP slightly elevated above goal value, patient did not take dose of BP medication last night, reports ran out of medication supply, although provider sent refill- reports [facility] told patient medication was not available. \par on Amlodipine and HCTZ\par medication renewal Amlodipine provided as requested \par taking CoQ10 as directed, however not always compliant\par improving compliance with lifestyle modification low chol/ low fat diet and reports daily walking \par cardiology Dr. De Paz, c/w plans for stress/ echo as discussed. \par in no acute distress and o/w offers no complaints \par check fasting lipid and cmp today - "labs drawn in office" \par \par DMT2/ obesity\par she was seeing endocrinology Dr. Oro, Rx Farxiga which patient has been taking as prescribed and tolerating well, however now needs new endocrinology as discussed, she is agreeable to continue care at Dr. Oro's office with alternative provide and will seek new provider if needed, contacts provided. reports endocrinology recently check a1c and agrees to provide BW results to pcp. \par in no acute distress and o/w offers no complaints \par a1c 11/21 was 9.7, goal is <7\par BMI improving \par in no acute distress and o/w offers no complaints

## 2022-06-13 NOTE — HEALTH RISK ASSESSMENT
[Never] : Never [No] : In the past 12 months have you used drugs other than those required for medical reasons? No [No falls in past year] : Patient reported no falls in the past year [0] : 2) Feeling down, depressed, or hopeless: Not at all (0) [PHQ-2 Negative - No further assessment needed] : PHQ-2 Negative - No further assessment needed [de-identified] : see HPI [de-identified] : see HPI [[id number]] : 0

## 2022-06-13 NOTE — PHYSICAL EXAM
[No Acute Distress] : no acute distress [Well Nourished] : well nourished [Well Developed] : well developed [No Respiratory Distress] : no respiratory distress  [No Accessory Muscle Use] : no accessory muscle use [Clear to Auscultation] : lungs were clear to auscultation bilaterally [Normal] : normal rate, regular rhythm, normal S1 and S2 and no murmur heard [No Carotid Bruits] : no carotid bruits [No Edema] : there was no peripheral edema [Soft] : abdomen soft [Non Tender] : non-tender [Non-distended] : non-distended [No Rash] : no rash [Coordination Grossly Intact] : coordination grossly intact [Normal Affect] : the affect was normal [Normal Insight/Judgement] : insight and judgment were intact [Normal Bowel Sounds] : normal bowel sounds [Normal Posterior Cervical Nodes] : no posterior cervical lymphadenopathy [Normal Anterior Cervical Nodes] : no anterior cervical lymphadenopathy [de-identified] : central obesity limits physical exam  [de-identified] : slight limp, o/w stable gait
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
## 2022-06-13 NOTE — COUNSELING
[Fall prevention counseling provided] : Fall prevention counseling provided [Adequate lighting] : Adequate lighting [No throw rugs] : No throw rugs [Use proper foot wear] : Use proper foot wear [Use recommended devices] : Use recommended devices [Behavioral health counseling provided] : Behavioral health counseling provided [Sleep ___ hours/day] : Sleep [unfilled] hours/day [Engage in a relaxing activity] : Engage in a relaxing activity [Plan in advance] : Plan in advance [None] : None [Good understanding] : Patient has a good understanding of lifestyle changes and steps needed to achieve self management goal [de-identified] : Maintain balanced diet of whole grain, fruits and vegetables, lean meats, skinless poultry, fish, beans, soy products, eggs, nuts, and low fat dairy as per FDA dietary guidelines. \par Avoid high calorie/low nutrient foods. \par vegetables/fruits- at least 5 servings/day, \par whole grains- 100% whole grain and at least 3 grams fiber/day.\par reduce/eliminate saturated fat- less than 5% on nutrition labels (ex. kruse, deli meat, hot dogs, fried foods, cookies, ice cream, chips, soft drinks, etc.)\par stay within your FDA recommended daily calorie needs or use the plate method to portion intake. \par Avoid fast food and limit eating out. When eating out choose healthy alternatives (ex. grilled, baked, steamed. low fat dressings. avoid french fries).\par DO NOT CONSUME FOODS YOU ARE ALLERGIC TO DESPITE DIETARY RECOMMENDATIONS.\par \par For more information you can visit www.Health.gov \par \par Incorporate regular physical activity most days of the week. \par Regular aerobic activity- moderate intensity, most days/wk, at least 30min/day- ex. brisk walk 2.5miles/hr, ballroom dancing, water aerobics, light yard work (raking/lawn mowing) actively playing basketball, biking 10miles/hr.\par Muscle strengthening and strength/resistance exercises 2-3days/wk- ex. free weights, resistance bands, your own weight (squats/pull-ups/push-ups).\par Neuro-motor exercises for balance/agility/coordination and flexibility exercises 2-3days/wk, 20-30min/day- ex. yoga and sony-chi.\par Bone strengthening at least 30min/day, most days of the week- ex. weights, resistance bands, running, brisk walking, jumping rope, stair climbing, tennis.\par Decrease sedentary time. \par LISTEN TO YOUR BODY AND MODIFY ACTIVITY AS NEEDED- DO NOT OVEREXERT YOURSELF DURING PHYSICAL ACTIVITY DESPITE RECOMMENDATION.\par \par For more information you can visit www.Health.gov \par \par Foot care: check feet daily and wash with mild soap and lukewarm water. \par Use lotions (as directed by podiatrist).\par File/clip toe nails after washing (as directed by podiatrist).\par Do not tear calluses. \par Use clean socks with well-fitted shoes.\par Do not go bare foot and do not cross legs. \par you should follow-up with a podiatrist yearly for maintenance of your feet.\par \par Eye care- you should follow-up with an ophthalmologist/optometrist yearly to screen for retinal damage and other complications that may occur secondary to diabetes. \par Bring the paperwork provided to you today to your visit to have the doctor complete and have the paperwork returned to our office.\par \par For more information you can visit www.medlineplus.gov  \par

## 2022-06-15 ENCOUNTER — NON-APPOINTMENT (OUTPATIENT)
Age: 62
End: 2022-06-15

## 2022-06-15 LAB
ALBUMIN SERPL ELPH-MCNC: 4.4 G/DL
ALP BLD-CCNC: 84 U/L
ALT SERPL-CCNC: 15 U/L
ANION GAP SERPL CALC-SCNC: 16 MMOL/L
AST SERPL-CCNC: 12 U/L
BILIRUB SERPL-MCNC: 0.4 MG/DL
BUN SERPL-MCNC: 11 MG/DL
CALCIUM SERPL-MCNC: 9.7 MG/DL
CHLORIDE SERPL-SCNC: 100 MMOL/L
CHOLEST SERPL-MCNC: 279 MG/DL
CO2 SERPL-SCNC: 23 MMOL/L
CREAT SERPL-MCNC: 0.42 MG/DL
EGFR: 112 ML/MIN/1.73M2
GLUCOSE SERPL-MCNC: 167 MG/DL
HDLC SERPL-MCNC: 53 MG/DL
LDLC SERPL CALC-MCNC: 173 MG/DL
NONHDLC SERPL-MCNC: 226 MG/DL
POTASSIUM SERPL-SCNC: 4 MMOL/L
PROT SERPL-MCNC: 7 G/DL
SODIUM SERPL-SCNC: 140 MMOL/L
TRIGL SERPL-MCNC: 268 MG/DL

## 2022-08-08 ENCOUNTER — APPOINTMENT (OUTPATIENT)
Dept: FAMILY MEDICINE | Facility: CLINIC | Age: 62
End: 2022-08-08

## 2022-12-05 ENCOUNTER — RX RENEWAL (OUTPATIENT)
Age: 62
End: 2022-12-05

## 2022-12-05 RX ORDER — AMLODIPINE BESYLATE 5 MG/1
5 TABLET ORAL DAILY
Qty: 90 | Refills: 0 | Status: ACTIVE | COMMUNITY
Start: 2021-06-29 | End: 1900-01-01

## 2023-03-31 NOTE — PATIENT PROFILE ADULT. - FUNCTIONAL SCREEN CURRENT LEVEL: SWALLOWING (IF SCORE 2 OR MORE FOR ANY ITEM, CONSULT REHAB SERVICES), MLM)
Pt via PV from home with c/o SOA, h/a x couple days.     All triage performed with this RN wearing appropriate PPE.  Pt placed in mask upon arrival to ED.    
(0) swallows foods/liquids without difficulty

## 2023-04-26 ENCOUNTER — OFFICE (OUTPATIENT)
Dept: URBAN - METROPOLITAN AREA CLINIC 102 | Facility: CLINIC | Age: 63
Setting detail: OPHTHALMOLOGY
End: 2023-04-26
Payer: COMMERCIAL

## 2023-04-26 DIAGNOSIS — H02.824: ICD-10-CM

## 2023-04-26 DIAGNOSIS — L03.213: ICD-10-CM

## 2023-04-26 DIAGNOSIS — H02.62: ICD-10-CM

## 2023-04-26 DIAGNOSIS — H04.212: ICD-10-CM

## 2023-04-26 DIAGNOSIS — H02.64: ICD-10-CM

## 2023-04-26 PROCEDURE — 68840 EXPLORE/IRRIGATE TEAR DUCTS: CPT | Performed by: OPHTHALMOLOGY

## 2023-04-26 PROCEDURE — 92285 EXTERNAL OCULAR PHOTOGRAPHY: CPT | Performed by: OPHTHALMOLOGY

## 2023-04-26 PROCEDURE — 92012 INTRM OPH EXAM EST PATIENT: CPT | Performed by: OPHTHALMOLOGY

## 2023-04-26 ASSESSMENT — REFRACTION_AUTOREFRACTION
OD_AXIS: 178
OD_CYLINDER: -3.25
OS_AXIS: 179
OD_SPHERE: -6.25
OS_SPHERE: -6.00
OS_CYLINDER: -3.50

## 2023-04-26 ASSESSMENT — KERATOMETRY
OS_K2POWER_DIOPTERS: 46.50
OD_K1POWER_DIOPTERS: 43.75
OD_K2POWER_DIOPTERS: 46.25
OS_K1POWER_DIOPTERS: 44.00
OS_AXISANGLE_DEGREES: 088
OD_AXISANGLE_DEGREES: 087

## 2023-04-26 ASSESSMENT — SPHEQUIV_DERIVED
OD_SPHEQUIV: -7.875
OS_SPHEQUIV: -7.75

## 2023-04-26 ASSESSMENT — AXIALLENGTH_DERIVED
OS_AL: 26.2348
OD_AL: 26.4097

## 2023-04-26 ASSESSMENT — VISUAL ACUITY
OS_BCVA: 20/25
OD_BCVA: 20/30

## 2023-04-26 ASSESSMENT — PUNCTA - ASSESSMENT: OS_PUNCTA: LLL SMALL

## 2023-04-26 ASSESSMENT — CONFRONTATIONAL VISUAL FIELD TEST (CVF)
OD_FINDINGS: FULL
OS_FINDINGS: FULL

## 2023-04-26 ASSESSMENT — PUNCTAL DISCHARGE: OS_PUNCTAL_DISCHARGE: ABSENT

## 2023-05-03 ENCOUNTER — RX ONLY (RX ONLY)
Age: 63
End: 2023-05-03

## 2023-05-03 ENCOUNTER — OFFICE (OUTPATIENT)
Dept: URBAN - METROPOLITAN AREA CLINIC 102 | Facility: CLINIC | Age: 63
Setting detail: OPHTHALMOLOGY
End: 2023-05-03
Payer: COMMERCIAL

## 2023-05-03 DIAGNOSIS — H02.824: ICD-10-CM

## 2023-05-03 DIAGNOSIS — H04.212: ICD-10-CM

## 2023-05-03 DIAGNOSIS — H02.64: ICD-10-CM

## 2023-05-03 DIAGNOSIS — H02.62: ICD-10-CM

## 2023-05-03 DIAGNOSIS — L03.213: ICD-10-CM

## 2023-05-03 PROBLEM — H00.15 CHALAZION; LEFT LOWER LID: Status: ACTIVE | Noted: 2023-05-03

## 2023-05-03 PROCEDURE — 99024 POSTOP FOLLOW-UP VISIT: CPT | Performed by: OPHTHALMOLOGY

## 2023-05-03 PROCEDURE — 68840 EXPLORE/IRRIGATE TEAR DUCTS: CPT | Performed by: OPHTHALMOLOGY

## 2023-05-03 ASSESSMENT — CONFRONTATIONAL VISUAL FIELD TEST (CVF)
OD_FINDINGS: FULL
OS_FINDINGS: FULL

## 2023-05-03 ASSESSMENT — PUNCTA - ASSESSMENT: OS_PUNCTA: LLL SMALL

## 2023-05-03 ASSESSMENT — KERATOMETRY
OD_K1POWER_DIOPTERS: 43.75
OS_AXISANGLE_DEGREES: 088
OD_K2POWER_DIOPTERS: 46.25
OS_K1POWER_DIOPTERS: 44.00
OS_K2POWER_DIOPTERS: 46.50
OD_AXISANGLE_DEGREES: 087

## 2023-05-03 ASSESSMENT — VISUAL ACUITY
OD_BCVA: 20/25+2
OS_BCVA: 20/20

## 2023-05-03 ASSESSMENT — PUNCTAL DISCHARGE: OS_PUNCTAL_DISCHARGE: ABSENT

## 2023-06-07 ENCOUNTER — OFFICE (OUTPATIENT)
Dept: URBAN - METROPOLITAN AREA CLINIC 102 | Facility: CLINIC | Age: 63
Setting detail: OPHTHALMOLOGY
End: 2023-06-07
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
## 2023-06-07 PROCEDURE — NO SHOW FE NO SHOW FEE: Performed by: OPHTHALMOLOGY

## 2023-07-12 ASSESSMENT — KERATOMETRY
OD_K2POWER_DIOPTERS: 46.25
OS_K2POWER_DIOPTERS: 46.50
OS_K1POWER_DIOPTERS: 44.00
OS_AXISANGLE_DEGREES: 088
OD_AXISANGLE_DEGREES: 087
OD_K1POWER_DIOPTERS: 43.75

## 2023-07-12 ASSESSMENT — REFRACTION_AUTOREFRACTION
OS_CYLINDER: -3.50
OS_AXIS: 179
OD_CYLINDER: -3.25
OD_AXIS: 178
OS_SPHERE: -6.00
OD_SPHERE: -6.25

## 2023-07-12 ASSESSMENT — SPHEQUIV_DERIVED
OS_SPHEQUIV: -7.75
OD_SPHEQUIV: -7.875

## 2023-07-12 ASSESSMENT — AXIALLENGTH_DERIVED
OD_AL: 26.4097
OS_AL: 26.2348

## 2025-03-07 ENCOUNTER — APPOINTMENT (OUTPATIENT)
Dept: ORTHOPEDIC SURGERY | Facility: CLINIC | Age: 65
End: 2025-03-07
Payer: COMMERCIAL

## 2025-03-07 VITALS — HEIGHT: 66 IN | WEIGHT: 250 LBS | BODY MASS INDEX: 40.18 KG/M2

## 2025-03-07 DIAGNOSIS — M25.551 PAIN IN RIGHT HIP: ICD-10-CM

## 2025-03-07 DIAGNOSIS — Q65.89 OTHER SPECIFIED CONGENITAL DEFORMITIES OF HIP: ICD-10-CM

## 2025-03-07 PROCEDURE — G2211 COMPLEX E/M VISIT ADD ON: CPT | Mod: NC

## 2025-03-07 PROCEDURE — 99204 OFFICE O/P NEW MOD 45 MIN: CPT

## 2025-03-07 PROCEDURE — 73502 X-RAY EXAM HIP UNI 2-3 VIEWS: CPT

## 2025-03-07 RX ORDER — MELOXICAM 15 MG/1
15 TABLET ORAL
Qty: 30 | Refills: 1 | Status: ACTIVE | COMMUNITY
Start: 2025-03-07 | End: 1900-01-01

## 2025-04-04 ENCOUNTER — APPOINTMENT (OUTPATIENT)
Dept: ORTHOPEDIC SURGERY | Facility: CLINIC | Age: 65
End: 2025-04-04

## 2025-08-01 NOTE — PATIENT PROFILE ADULT. - NS PRO OT REFERRAL QUES 1 YN
Attempted to contact pt and  Sandie to discuss referral to vascular surgery from Dr. Valencia requesting second opinion. Voice messages left for both requesting return call.  
no